# Patient Record
Sex: MALE
[De-identification: names, ages, dates, MRNs, and addresses within clinical notes are randomized per-mention and may not be internally consistent; named-entity substitution may affect disease eponyms.]

---

## 2017-04-18 ENCOUNTER — LABORATORY RESULT (OUTPATIENT)
Age: 67
End: 2017-04-18

## 2017-04-18 ENCOUNTER — APPOINTMENT (OUTPATIENT)
Dept: INTERNAL MEDICINE | Facility: CLINIC | Age: 67
End: 2017-04-18

## 2017-04-18 VITALS
WEIGHT: 229 LBS | TEMPERATURE: 98 F | SYSTOLIC BLOOD PRESSURE: 148 MMHG | RESPIRATION RATE: 16 BRPM | BODY MASS INDEX: 34.71 KG/M2 | OXYGEN SATURATION: 96 % | HEIGHT: 68 IN | HEART RATE: 104 BPM | DIASTOLIC BLOOD PRESSURE: 72 MMHG

## 2017-04-27 LAB
25(OH)D3 SERPL-MCNC: 14.9 NG/ML
ALBUMIN SERPL ELPH-MCNC: 4.9 G/DL
ALP BLD-CCNC: 89 U/L
ALT SERPL-CCNC: 33 U/L
ANION GAP SERPL CALC-SCNC: 18 MMOL/L
APPEARANCE: ABNORMAL
AST SERPL-CCNC: 26 U/L
BASOPHILS # BLD AUTO: 0.02 K/UL
BASOPHILS NFR BLD AUTO: 0.4 %
BILIRUB SERPL-MCNC: 0.3 MG/DL
BILIRUBIN URINE: NEGATIVE
BLOOD URINE: ABNORMAL
BUN SERPL-MCNC: 15 MG/DL
CALCIUM SERPL-MCNC: 10.3 MG/DL
CHLORIDE SERPL-SCNC: 102 MMOL/L
CHOLEST SERPL-MCNC: 202 MG/DL
CHOLEST/HDLC SERPL: 3.7 RATIO
CO2 SERPL-SCNC: 24 MMOL/L
COLOR: YELLOW
CREAT SERPL-MCNC: 0.96 MG/DL
CREAT SPEC-SCNC: 225 MG/DL
EOSINOPHIL # BLD AUTO: 0.19 K/UL
EOSINOPHIL NFR BLD AUTO: 3.5 %
FOLATE SERPL-MCNC: 11.6 NG/ML
GLUCOSE QUALITATIVE U: NORMAL MG/DL
GLUCOSE SERPL-MCNC: 113 MG/DL
HBA1C MFR BLD HPLC: 7.8 %
HCT VFR BLD CALC: 40.3 %
HDLC SERPL-MCNC: 55 MG/DL
HGB BLD-MCNC: 13.7 G/DL
IMM GRANULOCYTES NFR BLD AUTO: 0.2 %
KETONES URINE: NEGATIVE
LDLC SERPL CALC-MCNC: 121 MG/DL
LEUKOCYTE ESTERASE URINE: ABNORMAL
LYMPHOCYTES # BLD AUTO: 2.28 K/UL
LYMPHOCYTES NFR BLD AUTO: 41.5 %
MAN DIFF?: NORMAL
MCHC RBC-ENTMCNC: 27.1 PG
MCHC RBC-ENTMCNC: 34 GM/DL
MCV RBC AUTO: 79.8 FL
MICROALBUMIN 24H UR DL<=1MG/L-MCNC: 6.1 MG/DL
MICROALBUMIN/CREAT 24H UR-RTO: 27 UG/MG
MONOCYTES # BLD AUTO: 0.41 K/UL
MONOCYTES NFR BLD AUTO: 7.5 %
NEUTROPHILS # BLD AUTO: 2.59 K/UL
NEUTROPHILS NFR BLD AUTO: 46.9 %
NITRITE URINE: NEGATIVE
PH URINE: 5
PLATELET # BLD AUTO: 215 K/UL
POTASSIUM SERPL-SCNC: 4.9 MMOL/L
PROT SERPL-MCNC: 7.7 G/DL
PROTEIN URINE: ABNORMAL MG/DL
PSA SERPL-MCNC: 1.62 NG/ML
RBC # BLD: 5.05 M/UL
RBC # FLD: 14.5 %
SODIUM SERPL-SCNC: 144 MMOL/L
SPECIFIC GRAVITY URINE: 1.02
TRIGL SERPL-MCNC: 130 MG/DL
TSH SERPL-ACNC: 1.44 UIU/ML
UROBILINOGEN URINE: NORMAL MG/DL
VIT B12 SERPL-MCNC: 572 PG/ML
WBC # FLD AUTO: 5.5 K/UL

## 2017-05-16 ENCOUNTER — APPOINTMENT (OUTPATIENT)
Dept: CARDIOLOGY | Facility: CLINIC | Age: 67
End: 2017-05-16

## 2017-08-08 ENCOUNTER — APPOINTMENT (OUTPATIENT)
Dept: INTERNAL MEDICINE | Facility: CLINIC | Age: 67
End: 2017-08-08
Payer: MEDICARE

## 2017-08-08 VITALS
HEART RATE: 98 BPM | DIASTOLIC BLOOD PRESSURE: 76 MMHG | SYSTOLIC BLOOD PRESSURE: 150 MMHG | RESPIRATION RATE: 16 BRPM | HEIGHT: 68 IN | BODY MASS INDEX: 33.95 KG/M2 | OXYGEN SATURATION: 98 % | TEMPERATURE: 98.4 F | WEIGHT: 224 LBS

## 2017-08-08 PROCEDURE — 99214 OFFICE O/P EST MOD 30 MIN: CPT

## 2017-08-10 LAB
ALBUMIN SERPL ELPH-MCNC: 4.5 G/DL
ALP BLD-CCNC: 86 U/L
ALT SERPL-CCNC: 22 U/L
ANION GAP SERPL CALC-SCNC: 18 MMOL/L
AST SERPL-CCNC: 21 U/L
BASOPHILS # BLD AUTO: 0.03 K/UL
BASOPHILS NFR BLD AUTO: 0.6 %
BILIRUB SERPL-MCNC: 0.3 MG/DL
BUN SERPL-MCNC: 16 MG/DL
CALCIUM SERPL-MCNC: 9.9 MG/DL
CHLORIDE SERPL-SCNC: 103 MMOL/L
CO2 SERPL-SCNC: 22 MMOL/L
CREAT SERPL-MCNC: 0.84 MG/DL
CREAT SPEC-SCNC: 208 MG/DL
EOSINOPHIL # BLD AUTO: 0.15 K/UL
EOSINOPHIL NFR BLD AUTO: 2.9 %
GLUCOSE SERPL-MCNC: 148 MG/DL
HBA1C MFR BLD HPLC: 7.3 %
HCT VFR BLD CALC: 39.1 %
HGB BLD-MCNC: 13.5 G/DL
IMM GRANULOCYTES NFR BLD AUTO: 0.2 %
LYMPHOCYTES # BLD AUTO: 1.29 K/UL
LYMPHOCYTES NFR BLD AUTO: 25.1 %
MAN DIFF?: NORMAL
MCHC RBC-ENTMCNC: 27.5 PG
MCHC RBC-ENTMCNC: 34.5 GM/DL
MCV RBC AUTO: 79.6 FL
MICROALBUMIN 24H UR DL<=1MG/L-MCNC: 6.4 MG/DL
MICROALBUMIN/CREAT 24H UR-RTO: 31 MG/G
MONOCYTES # BLD AUTO: 0.39 K/UL
MONOCYTES NFR BLD AUTO: 7.6 %
NEUTROPHILS # BLD AUTO: 3.26 K/UL
NEUTROPHILS NFR BLD AUTO: 63.6 %
PLATELET # BLD AUTO: 188 K/UL
POTASSIUM SERPL-SCNC: 3.8 MMOL/L
PROT SERPL-MCNC: 7.6 G/DL
RBC # BLD: 4.91 M/UL
RBC # FLD: 14.2 %
SODIUM SERPL-SCNC: 143 MMOL/L
WBC # FLD AUTO: 5.13 K/UL

## 2017-10-03 ENCOUNTER — NON-APPOINTMENT (OUTPATIENT)
Age: 67
End: 2017-10-03

## 2017-10-03 ENCOUNTER — APPOINTMENT (OUTPATIENT)
Dept: CARDIOLOGY | Facility: CLINIC | Age: 67
End: 2017-10-03
Payer: MEDICARE

## 2017-10-03 VITALS
RESPIRATION RATE: 16 BRPM | HEIGHT: 68 IN | HEART RATE: 83 BPM | DIASTOLIC BLOOD PRESSURE: 75 MMHG | TEMPERATURE: 97.9 F | BODY MASS INDEX: 33.95 KG/M2 | WEIGHT: 224 LBS | OXYGEN SATURATION: 97 % | SYSTOLIC BLOOD PRESSURE: 165 MMHG

## 2017-10-03 VITALS — SYSTOLIC BLOOD PRESSURE: 154 MMHG | DIASTOLIC BLOOD PRESSURE: 71 MMHG

## 2017-10-03 DIAGNOSIS — Z00.00 ENCOUNTER FOR GENERAL ADULT MEDICAL EXAMINATION W/OUT ABNORMAL FINDINGS: ICD-10-CM

## 2017-10-03 PROCEDURE — 99204 OFFICE O/P NEW MOD 45 MIN: CPT

## 2017-10-03 PROCEDURE — 93000 ELECTROCARDIOGRAM COMPLETE: CPT

## 2017-10-05 RX ORDER — GABAPENTIN 100 MG/1
100 CAPSULE ORAL TWICE DAILY
Qty: 60 | Refills: 3 | Status: DISCONTINUED | COMMUNITY
Start: 2017-04-18 | End: 2017-10-05

## 2017-10-31 ENCOUNTER — APPOINTMENT (OUTPATIENT)
Dept: HOME HEALTH SERVICES | Facility: HOME HEALTH | Age: 67
End: 2017-10-31
Payer: MEDICARE

## 2017-10-31 VITALS
HEART RATE: 76 BPM | DIASTOLIC BLOOD PRESSURE: 70 MMHG | TEMPERATURE: 97.5 F | OXYGEN SATURATION: 97 % | RESPIRATION RATE: 18 BRPM | SYSTOLIC BLOOD PRESSURE: 142 MMHG

## 2017-10-31 PROCEDURE — 99344 HOME/RES VST NEW MOD MDM 60: CPT | Mod: 25

## 2017-10-31 PROCEDURE — G0506: CPT

## 2017-10-31 PROCEDURE — G0439: CPT

## 2017-10-31 PROCEDURE — 99497 ADVNCD CARE PLAN 30 MIN: CPT | Mod: 25,33

## 2017-10-31 PROCEDURE — G0008: CPT

## 2017-10-31 PROCEDURE — 90686 IIV4 VACC NO PRSV 0.5 ML IM: CPT

## 2017-11-03 ENCOUNTER — LABORATORY RESULT (OUTPATIENT)
Age: 67
End: 2017-11-03

## 2017-11-03 LAB
BASOPHILS # BLD AUTO: 0.02 K/UL
BASOPHILS NFR BLD AUTO: 0.4 %
EOSINOPHIL # BLD AUTO: 0.23 K/UL
EOSINOPHIL NFR BLD AUTO: 4.5 %
HBA1C MFR BLD HPLC: 7.5 %
HCT VFR BLD CALC: 38.4 %
HGB BLD-MCNC: 13.2 G/DL
IMM GRANULOCYTES NFR BLD AUTO: 0.2 %
LYMPHOCYTES # BLD AUTO: 2.2 K/UL
LYMPHOCYTES NFR BLD AUTO: 43.2 %
MAN DIFF?: NORMAL
MCHC RBC-ENTMCNC: 27.6 PG
MCHC RBC-ENTMCNC: 34.4 GM/DL
MCV RBC AUTO: 80.3 FL
MONOCYTES # BLD AUTO: 0.44 K/UL
MONOCYTES NFR BLD AUTO: 8.6 %
NEUTROPHILS # BLD AUTO: 2.19 K/UL
NEUTROPHILS NFR BLD AUTO: 43.1 %
PLATELET # BLD AUTO: 247 K/UL
RBC # BLD: 4.78 M/UL
RBC # FLD: 14.5 %
WBC # FLD AUTO: 5.09 K/UL

## 2017-11-07 LAB
25(OH)D3 SERPL-MCNC: 13.1 NG/ML
CHOLEST SERPL-MCNC: 187 MG/DL
CHOLEST/HDLC SERPL: 3.8 RATIO
FOLATE SERPL-MCNC: 12.7 NG/ML
HDLC SERPL-MCNC: 49 MG/DL
LDLC SERPL CALC-MCNC: 112 MG/DL
TRIGL SERPL-MCNC: 132 MG/DL
TSH SERPL-ACNC: 1.02 UIU/ML
URATE SERPL-MCNC: 5.9 MG/DL
VIT B12 SERPL-MCNC: 1775 PG/ML

## 2017-11-10 ENCOUNTER — MEDICATION RENEWAL (OUTPATIENT)
Age: 67
End: 2017-11-10

## 2017-11-13 ENCOUNTER — APPOINTMENT (OUTPATIENT)
Dept: CARDIOLOGY | Facility: CLINIC | Age: 67
End: 2017-11-13

## 2017-11-28 ENCOUNTER — APPOINTMENT (OUTPATIENT)
Dept: CARDIOLOGY | Facility: CLINIC | Age: 67
End: 2017-11-28

## 2017-12-05 ENCOUNTER — OTHER (OUTPATIENT)
Age: 67
End: 2017-12-05

## 2017-12-29 ENCOUNTER — OTHER (OUTPATIENT)
Age: 67
End: 2017-12-29

## 2018-02-20 ENCOUNTER — APPOINTMENT (OUTPATIENT)
Dept: HOME HEALTH SERVICES | Facility: HOME HEALTH | Age: 68
End: 2018-02-20
Payer: MEDICARE

## 2018-02-20 VITALS
DIASTOLIC BLOOD PRESSURE: 68 MMHG | HEART RATE: 79 BPM | RESPIRATION RATE: 18 BRPM | OXYGEN SATURATION: 97 % | SYSTOLIC BLOOD PRESSURE: 128 MMHG

## 2018-02-20 PROCEDURE — 99349 HOME/RES VST EST MOD MDM 40: CPT

## 2018-06-21 ENCOUNTER — APPOINTMENT (OUTPATIENT)
Dept: HOME HEALTH SERVICES | Facility: HOME HEALTH | Age: 68
End: 2018-06-21
Payer: MEDICARE

## 2018-06-21 VITALS
HEIGHT: 68 IN | WEIGHT: 220 LBS | DIASTOLIC BLOOD PRESSURE: 72 MMHG | RESPIRATION RATE: 16 BRPM | BODY MASS INDEX: 33.34 KG/M2 | SYSTOLIC BLOOD PRESSURE: 138 MMHG | OXYGEN SATURATION: 98 % | HEART RATE: 87 BPM | TEMPERATURE: 98.1 F

## 2018-06-21 PROCEDURE — 99349 HOME/RES VST EST MOD MDM 40: CPT | Mod: 25

## 2018-06-21 PROCEDURE — 99497 ADVNCD CARE PLAN 30 MIN: CPT

## 2018-06-25 ENCOUNTER — MEDICATION RENEWAL (OUTPATIENT)
Age: 68
End: 2018-06-25

## 2018-06-25 LAB
25(OH)D3 SERPL-MCNC: 18 NG/ML
ALBUMIN SERPL ELPH-MCNC: 4.4 G/DL
ALP BLD-CCNC: 84 U/L
ALT SERPL-CCNC: 28 U/L
ANION GAP SERPL CALC-SCNC: 18 MMOL/L
AST SERPL-CCNC: 24 U/L
BASOPHILS # BLD AUTO: 0.02 K/UL
BASOPHILS NFR BLD AUTO: 0.5 %
BILIRUB SERPL-MCNC: 0.3 MG/DL
BUN SERPL-MCNC: 21 MG/DL
CALCIUM SERPL-MCNC: 10.5 MG/DL
CHLORIDE SERPL-SCNC: 101 MMOL/L
CHOLEST SERPL-MCNC: 204 MG/DL
CHOLEST/HDLC SERPL: 3.7 RATIO
CO2 SERPL-SCNC: 23 MMOL/L
CREAT SERPL-MCNC: 1 MG/DL
EOSINOPHIL # BLD AUTO: 0.13 K/UL
EOSINOPHIL NFR BLD AUTO: 3.1 %
FOLATE SERPL-MCNC: 11.7 NG/ML
HBA1C MFR BLD HPLC: 7.9 %
HCT VFR BLD CALC: 41.2 %
HDLC SERPL-MCNC: 55 MG/DL
HGB BLD-MCNC: 13.9 G/DL
IMM GRANULOCYTES NFR BLD AUTO: 0 %
LDLC SERPL CALC-MCNC: 115 MG/DL
LYMPHOCYTES # BLD AUTO: 2.12 K/UL
LYMPHOCYTES NFR BLD AUTO: 50.8 %
MAN DIFF?: NORMAL
MCHC RBC-ENTMCNC: 27.1 PG
MCHC RBC-ENTMCNC: 33.7 GM/DL
MCV RBC AUTO: 80.3 FL
MONOCYTES # BLD AUTO: 0.3 K/UL
MONOCYTES NFR BLD AUTO: 7.2 %
NEUTROPHILS # BLD AUTO: 1.6 K/UL
NEUTROPHILS NFR BLD AUTO: 38.4 %
PLATELET # BLD AUTO: 221 K/UL
POTASSIUM SERPL-SCNC: 4.4 MMOL/L
PROT SERPL-MCNC: 7.8 G/DL
RBC # BLD: 5.13 M/UL
RBC # FLD: 15 %
SODIUM SERPL-SCNC: 142 MMOL/L
TRIGL SERPL-MCNC: 170 MG/DL
TSH SERPL-ACNC: 1.23 UIU/ML
URATE SERPL-MCNC: 5.4 MG/DL
VIT B12 SERPL-MCNC: 750 PG/ML
WBC # FLD AUTO: 4.17 K/UL

## 2018-08-20 ENCOUNTER — OTHER (OUTPATIENT)
Age: 68
End: 2018-08-20

## 2018-10-17 ENCOUNTER — APPOINTMENT (OUTPATIENT)
Dept: HOME HEALTH SERVICES | Facility: HOME HEALTH | Age: 68
End: 2018-10-17

## 2018-10-17 ENCOUNTER — APPOINTMENT (OUTPATIENT)
Dept: HOME HEALTH SERVICES | Facility: HOME HEALTH | Age: 68
End: 2018-10-17
Payer: MEDICARE

## 2018-10-17 VITALS
OXYGEN SATURATION: 98 % | SYSTOLIC BLOOD PRESSURE: 179 MMHG | DIASTOLIC BLOOD PRESSURE: 98 MMHG | RESPIRATION RATE: 14 BRPM | TEMPERATURE: 97.5 F | HEART RATE: 76 BPM

## 2018-10-17 DIAGNOSIS — F17.200 NICOTINE DEPENDENCE, UNSPECIFIED, UNCOMPLICATED: ICD-10-CM

## 2018-10-17 DIAGNOSIS — Z86.39 PERSONAL HISTORY OF OTHER ENDOCRINE, NUTRITIONAL AND METABOLIC DISEASE: ICD-10-CM

## 2018-10-17 PROCEDURE — 99349 HOME/RES VST EST MOD MDM 40: CPT | Mod: 25

## 2018-10-17 PROCEDURE — G0008: CPT

## 2018-10-17 PROCEDURE — 90662 IIV NO PRSV INCREASED AG IM: CPT

## 2018-10-17 PROCEDURE — 99406 BEHAV CHNG SMOKING 3-10 MIN: CPT

## 2018-10-18 PROBLEM — F17.200 CURRENT EVERY DAY SMOKER: Status: ACTIVE | Noted: 2018-10-18

## 2018-10-19 ENCOUNTER — LABORATORY RESULT (OUTPATIENT)
Age: 68
End: 2018-10-19

## 2018-10-23 ENCOUNTER — MEDICATION RENEWAL (OUTPATIENT)
Age: 68
End: 2018-10-23

## 2018-10-23 ENCOUNTER — TRANSCRIPTION ENCOUNTER (OUTPATIENT)
Age: 68
End: 2018-10-23

## 2018-10-23 ENCOUNTER — RX RENEWAL (OUTPATIENT)
Age: 68
End: 2018-10-23

## 2018-10-25 ENCOUNTER — APPOINTMENT (OUTPATIENT)
Dept: HOME HEALTH SERVICES | Facility: HOME HEALTH | Age: 68
End: 2018-10-25

## 2018-11-02 ENCOUNTER — APPOINTMENT (OUTPATIENT)
Dept: HOME HEALTH SERVICES | Facility: HOME HEALTH | Age: 68
End: 2018-11-02

## 2018-11-27 ENCOUNTER — TRANSCRIPTION ENCOUNTER (OUTPATIENT)
Age: 68
End: 2018-11-27

## 2018-11-28 ENCOUNTER — APPOINTMENT (OUTPATIENT)
Dept: HOME HEALTH SERVICES | Facility: HOME HEALTH | Age: 68
End: 2018-11-28

## 2018-11-28 VITALS
HEART RATE: 76 BPM | TEMPERATURE: 97.8 F | RESPIRATION RATE: 16 BRPM | SYSTOLIC BLOOD PRESSURE: 168 MMHG | DIASTOLIC BLOOD PRESSURE: 86 MMHG | OXYGEN SATURATION: 98 %

## 2018-12-03 PROBLEM — M65.331 TRIGGER MIDDLE FINGER OF RIGHT HAND: Status: RESOLVED | Noted: 2017-08-08 | Resolved: 2018-12-03

## 2018-12-03 PROBLEM — Z23 INFLUENZA VACCINE ADMINISTERED: Status: RESOLVED | Noted: 2017-10-31 | Resolved: 2018-12-03

## 2018-12-03 PROBLEM — Z87.898 HISTORY OF ITCHING: Status: RESOLVED | Noted: 2018-10-23 | Resolved: 2018-12-03

## 2018-12-03 PROBLEM — Z86.79 HISTORY OF ABNORMAL ELECTROCARDIOGRAPHY: Status: RESOLVED | Noted: 2017-10-03 | Resolved: 2018-12-03

## 2018-12-03 RX ORDER — LOSARTAN POTASSIUM 50 MG/1
50 TABLET, FILM COATED ORAL DAILY
Qty: 90 | Refills: 1 | Status: DISCONTINUED | COMMUNITY
Start: 2018-10-23 | End: 2018-12-03

## 2018-12-04 ENCOUNTER — APPOINTMENT (OUTPATIENT)
Dept: HOME HEALTH SERVICES | Facility: HOME HEALTH | Age: 68
End: 2018-12-04
Payer: MEDICARE

## 2018-12-04 VITALS
HEART RATE: 75 BPM | OXYGEN SATURATION: 98 % | RESPIRATION RATE: 14 BRPM | DIASTOLIC BLOOD PRESSURE: 73 MMHG | TEMPERATURE: 97.2 F | SYSTOLIC BLOOD PRESSURE: 159 MMHG

## 2018-12-04 DIAGNOSIS — Z23 ENCOUNTER FOR IMMUNIZATION: ICD-10-CM

## 2018-12-04 DIAGNOSIS — Z87.898 PERSONAL HISTORY OF OTHER SPECIFIED CONDITIONS: ICD-10-CM

## 2018-12-04 DIAGNOSIS — M65.331 TRIGGER FINGER, RIGHT MIDDLE FINGER: ICD-10-CM

## 2018-12-04 DIAGNOSIS — Z86.79 PERSONAL HISTORY OF OTHER DISEASES OF THE CIRCULATORY SYSTEM: ICD-10-CM

## 2018-12-04 PROCEDURE — 99406 BEHAV CHNG SMOKING 3-10 MIN: CPT

## 2018-12-04 PROCEDURE — 99350 HOME/RES VST EST HIGH MDM 60: CPT | Mod: 25

## 2018-12-04 RX ORDER — DOXEPIN HYDROCHLORIDE 50 MG/G
5 CREAM TOPICAL
Qty: 45 | Refills: 3 | Status: DISCONTINUED | COMMUNITY
Start: 2018-10-23 | End: 2018-12-04

## 2018-12-04 RX ORDER — OMEPRAZOLE 40 MG/1
40 CAPSULE, DELAYED RELEASE ORAL
Qty: 90 | Refills: 3 | Status: DISCONTINUED | COMMUNITY
Start: 2018-11-28 | End: 2018-12-04

## 2018-12-28 ENCOUNTER — APPOINTMENT (OUTPATIENT)
Dept: HOME HEALTH SERVICES | Facility: HOME HEALTH | Age: 68
End: 2018-12-28

## 2018-12-28 VITALS
DIASTOLIC BLOOD PRESSURE: 96 MMHG | SYSTOLIC BLOOD PRESSURE: 178 MMHG | HEART RATE: 78 BPM | TEMPERATURE: 98.2 F | RESPIRATION RATE: 16 BRPM | OXYGEN SATURATION: 98 %

## 2019-01-08 ENCOUNTER — OTHER (OUTPATIENT)
Age: 69
End: 2019-01-08

## 2019-01-11 ENCOUNTER — APPOINTMENT (OUTPATIENT)
Dept: HOME HEALTH SERVICES | Facility: HOME HEALTH | Age: 69
End: 2019-01-11

## 2019-03-08 ENCOUNTER — APPOINTMENT (OUTPATIENT)
Dept: HOME HEALTH SERVICES | Facility: HOME HEALTH | Age: 69
End: 2019-03-08
Payer: MEDICARE

## 2019-03-08 VITALS
RESPIRATION RATE: 16 BRPM | DIASTOLIC BLOOD PRESSURE: 74 MMHG | TEMPERATURE: 97.6 F | HEART RATE: 76 BPM | SYSTOLIC BLOOD PRESSURE: 162 MMHG | OXYGEN SATURATION: 97 %

## 2019-03-08 DIAGNOSIS — Z72.0 TOBACCO USE: ICD-10-CM

## 2019-03-08 DIAGNOSIS — Z71.89 OTHER SPECIFIED COUNSELING: ICD-10-CM

## 2019-03-08 DIAGNOSIS — R07.89 OTHER CHEST PAIN: ICD-10-CM

## 2019-03-08 DIAGNOSIS — M15.0 PRIMARY GENERALIZED (OSTEO)ARTHRITIS: ICD-10-CM

## 2019-03-08 PROCEDURE — 99349 HOME/RES VST EST MOD MDM 40: CPT | Mod: 25

## 2019-03-08 PROCEDURE — 99406 BEHAV CHNG SMOKING 3-10 MIN: CPT

## 2019-03-08 NOTE — CHRONIC CARE ASSESSMENT
[Oriented To Person] : ~L oriented to person [Oriented To Place] : ~L oriented to place [Oriented To Time] : ~L oriented to time

## 2019-03-12 PROBLEM — Z72.0 TOBACCO USER: Status: ACTIVE | Noted: 2018-12-04

## 2019-03-12 PROBLEM — M15.0 PRIMARY OSTEOARTHRITIS INVOLVING MULTIPLE JOINTS: Status: ACTIVE | Noted: 2017-10-31

## 2019-03-12 PROBLEM — Z71.89 ADVANCE CARE PLANNING: Status: RESOLVED | Noted: 2017-10-31 | Resolved: 2019-03-12

## 2019-03-12 PROBLEM — R07.89 ATYPICAL CHEST PAIN: Status: RESOLVED | Noted: 2018-12-04 | Resolved: 2019-03-12

## 2019-03-12 NOTE — PHYSICAL EXAM
[No Acute Distress] : no acute distress [Well Nourished] : well nourished [Well Developed] : well developed [Normal Sclera/Conjunctiva] : normal sclera/conjunctiva [EOMI] : extra ocular movement intact [Normal Outer Ear/Nose] : the ears and nose were normal in appearance [Normal Oropharynx] : the oropharynx was normal [No Respiratory Distress] : no respiratory distress [Clear to Auscultation] : lungs were clear to auscultation bilaterally [No Accessory Muscle Use] : no accessory muscle use [Normal Rate] : heart rate was normal  [Regular Rhythm] : with a regular rhythm [Normal S1, S2] : normal S1 and S2 [No Murmurs] : no murmurs heard [No Edema] : there was no peripheral edema [Normal Bowel Sounds] : normal bowel sounds [Non Tender] : non-tender [Soft] : abdomen soft [Not Distended] : not distended [Normal Post Cervical Nodes] : no posterior cervical lymphadenopathy [Normal Anterior Cervical Nodes] : no anterior cervical lymphadenopathy [No CVA Tenderness] : no ~M costovertebral angle tenderness [No Spinal Tenderness] : no spinal tenderness [Normal Gait] : normal gait [Normal Strength/Tone] : muscle strength and tone were normal [No Rash] : no rash [Oriented x3] : oriented to person, place, and time [Normal Affect] : the affect was normal [No Skin Lesions] : no skin lesions

## 2019-03-12 NOTE — COUNSELING
[Smoker, not interested in quitting] : Smoker, not interested in quitting [Remove clutter and unsafe carpeting to avoid falls] : remove clutter and unsafe carpeting to avoid falls [Improve mobility] : improve mobility [Improve weight] : improve weight [Discussed disease trajectory with patient/caregiver] : discussed disease trajectory with patient/caregiver [Completed Medical Orders for Life-Sustaining Treatment] : completed medical orders for life-sustaining treatment [Full Code] : Code Status: Full Code [No Limitations] : Treatment Guidelines: No limitations [Trial of Intubation] : Intubation: Trial of Intubation [[facility]: _____] [facility]last [facility] date: [unfilled] [_____] : HCP: [unfilled] [Normal Weight (BMI <25)] : normal weight - BMI <25 [Not Indicated] : not indicated [17997 - Tobacco Use Cessation Intermediate Greater Than 3 Minutes Up to 10 Minutes] : Tobacco Use Cessation Intermediate Greater Than 3 Minutes Up to 10 Minutes

## 2019-03-12 NOTE — REASON FOR VISIT
[Follow-Up] : a follow-up visit [Spouse] : spouse [Pre-Visit Preparation] : pre-visit preparation was done [Intercurrent Specialty/Sub-specialty Visits] : the patient has no intercurrent specialty/sub-specialty visits [FreeTextEntry1] : chronic conditions

## 2019-03-12 NOTE — HISTORY OF PRESENT ILLNESS
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

## 2019-03-20 ENCOUNTER — MEDICATION RENEWAL (OUTPATIENT)
Age: 69
End: 2019-03-20

## 2019-06-07 ENCOUNTER — APPOINTMENT (OUTPATIENT)
Dept: HOME HEALTH SERVICES | Facility: HOME HEALTH | Age: 69
End: 2019-06-07
Payer: MEDICARE

## 2019-06-07 VITALS
OXYGEN SATURATION: 98 % | HEART RATE: 78 BPM | DIASTOLIC BLOOD PRESSURE: 90 MMHG | SYSTOLIC BLOOD PRESSURE: 180 MMHG | RESPIRATION RATE: 14 BRPM | TEMPERATURE: 98 F

## 2019-06-07 PROCEDURE — 99349 HOME/RES VST EST MOD MDM 40: CPT

## 2019-06-10 NOTE — COUNSELING
[Normal Weight (BMI <25)] : normal weight - BMI <25 [Smoker, not interested in quitting] : Smoker, not interested in quitting [Remove clutter and unsafe carpeting to avoid falls] : remove clutter and unsafe carpeting to avoid falls [Not Indicated] : not indicated [Improve mobility] : improve mobility [Improve weight] : improve weight [Discussed disease trajectory with patient/caregiver] : discussed disease trajectory with patient/caregiver [Completed Medical Orders for Life-Sustaining Treatment] : completed medical orders for life-sustaining treatment [Full Code] : Code Status: Full Code [No Limitations] : Treatment Guidelines: No limitations [Trial of Intubation] : Intubation: Trial of Intubation [_____] : HCP: [unfilled] [Completed DNR] : completed DNR [Completed Healthcare Proxy] : completed healthcare proxy [[facility]: _____[facility]/last verification date: [unfilled]

## 2019-06-11 ENCOUNTER — RX RENEWAL (OUTPATIENT)
Age: 69
End: 2019-06-11

## 2019-06-11 ENCOUNTER — APPOINTMENT (OUTPATIENT)
Dept: HOME HEALTH SERVICES | Facility: HOME HEALTH | Age: 69
End: 2019-06-11

## 2019-06-11 NOTE — HISTORY OF PRESENT ILLNESS
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 [FreeTextEntry2] : PMH: diabetes mellitus type 2 with neuropathy,  hypertension, hyperlipidemia, smoker,  BPH, \par \par Pt A&Ox3 reports pain in both wrist with numbness of fingers which increases at night; also reports has been "taking medications sparingly because of my insurance"\par \par DM: A1c 8.9 in October, not checking blood sugars, not taking  metformin glipizide or trulicity; has hand pain today- on gabapentin but not taking medications as above\par \par HTN: on amlodipine, losartan, chlorthalidone- but not taking\par \par HLD: elevated triglycerides, cholesterol in 6/18- on atorvastatin - but not taking\par \par BPH: on doxazosin- has had no urinary issues- but not taking\par \par Appetite good, has no constipation,

## 2019-06-21 LAB
ALBUMIN SERPL ELPH-MCNC: 4.3 G/DL
ALP BLD-CCNC: 93 U/L
ALT SERPL-CCNC: 12 U/L
ANION GAP SERPL CALC-SCNC: 12 MMOL/L
AST SERPL-CCNC: 11 U/L
BASOPHILS # BLD AUTO: 0.03 K/UL
BASOPHILS NFR BLD AUTO: 0.6 %
BILIRUB SERPL-MCNC: 0.5 MG/DL
BUN SERPL-MCNC: 13 MG/DL
CALCIUM SERPL-MCNC: 10 MG/DL
CHLORIDE SERPL-SCNC: 100 MMOL/L
CHOLEST SERPL-MCNC: 270 MG/DL
CHOLEST/HDLC SERPL: 5.9 RATIO
CO2 SERPL-SCNC: 28 MMOL/L
CREAT SERPL-MCNC: 1.01 MG/DL
EOSINOPHIL # BLD AUTO: 0.16 K/UL
EOSINOPHIL NFR BLD AUTO: 3.1 %
ESTIMATED AVERAGE GLUCOSE: 229 MG/DL
HBA1C MFR BLD HPLC: 9.6 %
HCT VFR BLD CALC: 41 %
HDLC SERPL-MCNC: 46 MG/DL
HGB BLD-MCNC: 14 G/DL
IMM GRANULOCYTES NFR BLD AUTO: 0.2 %
LDLC SERPL CALC-MCNC: 189 MG/DL
LYMPHOCYTES # BLD AUTO: 2.1 K/UL
LYMPHOCYTES NFR BLD AUTO: 41.3 %
MAN DIFF?: NORMAL
MCHC RBC-ENTMCNC: 26.8 PG
MCHC RBC-ENTMCNC: 34.1 GM/DL
MCV RBC AUTO: 78.4 FL
MONOCYTES # BLD AUTO: 0.39 K/UL
MONOCYTES NFR BLD AUTO: 7.7 %
NEUTROPHILS # BLD AUTO: 2.39 K/UL
NEUTROPHILS NFR BLD AUTO: 47.1 %
PLATELET # BLD AUTO: 226 K/UL
POTASSIUM SERPL-SCNC: 4.4 MMOL/L
PROT SERPL-MCNC: 7 G/DL
RBC # BLD: 5.23 M/UL
RBC # FLD: 14.1 %
SODIUM SERPL-SCNC: 140 MMOL/L
TRIGL SERPL-MCNC: 177 MG/DL
WBC # FLD AUTO: 5.08 K/UL

## 2019-07-10 ENCOUNTER — MEDICATION RENEWAL (OUTPATIENT)
Age: 69
End: 2019-07-10

## 2019-07-24 ENCOUNTER — APPOINTMENT (OUTPATIENT)
Dept: HOME HEALTH SERVICES | Facility: HOME HEALTH | Age: 69
End: 2019-07-24

## 2019-07-24 VITALS
OXYGEN SATURATION: 98 % | HEART RATE: 76 BPM | DIASTOLIC BLOOD PRESSURE: 86 MMHG | SYSTOLIC BLOOD PRESSURE: 162 MMHG | TEMPERATURE: 98.6 F | RESPIRATION RATE: 16 BRPM

## 2019-08-08 ENCOUNTER — APPOINTMENT (OUTPATIENT)
Dept: HOME HEALTH SERVICES | Facility: HOME HEALTH | Age: 69
End: 2019-08-08

## 2019-08-12 ENCOUNTER — APPOINTMENT (OUTPATIENT)
Dept: HOME HEALTH SERVICES | Facility: HOME HEALTH | Age: 69
End: 2019-08-12

## 2019-08-19 ENCOUNTER — APPOINTMENT (OUTPATIENT)
Dept: HOME HEALTH SERVICES | Facility: HOME HEALTH | Age: 69
End: 2019-08-19
Payer: MEDICARE

## 2019-08-19 VITALS
RESPIRATION RATE: 14 BRPM | HEART RATE: 76 BPM | SYSTOLIC BLOOD PRESSURE: 150 MMHG | TEMPERATURE: 97.9 F | DIASTOLIC BLOOD PRESSURE: 84 MMHG | OXYGEN SATURATION: 98 %

## 2019-08-19 PROCEDURE — 99349 HOME/RES VST EST MOD MDM 40: CPT

## 2019-08-19 NOTE — COUNSELING
[Improve mobility] : improve mobility [Improve weight] : improve weight [Discussed disease trajectory with patient/caregiver] : discussed disease trajectory with patient/caregiver [Completed Healthcare Proxy] : completed healthcare proxy [Completed DNR] : completed DNR [Completed Medical Orders for Life-Sustaining Treatment] : completed medical orders for life-sustaining treatment [Full Code] : Code Status: Full Code [Trial of Intubation] : Intubation: Trial of Intubation [No Limitations] : Treatment Guidelines: No limitations [[facility]: _____] [facility]last verification date: [unfilled] [_____] : HCP: [unfilled] [Normal Weight (BMI <25)] : normal weight - BMI <25 [Remove clutter and unsafe carpeting to avoid falls] : remove clutter and unsafe carpeting to avoid falls [Smoker, not interested in quitting] : Smoker, not interested in quitting [Not Indicated] : not indicated

## 2019-08-20 ENCOUNTER — APPOINTMENT (OUTPATIENT)
Dept: HOME HEALTH SERVICES | Facility: HOME HEALTH | Age: 69
End: 2019-08-20

## 2019-08-21 NOTE — PHYSICAL EXAM
[No Acute Distress] : no acute distress [Well Nourished] : well nourished [Well Developed] : well developed [EOMI] : extra ocular movement intact [Normal Sclera/Conjunctiva] : normal sclera/conjunctiva [Normal Outer Ear/Nose] : the ears and nose were normal in appearance [Normal Oropharynx] : the oropharynx was normal [No Respiratory Distress] : no respiratory distress [Clear to Auscultation] : lungs were clear to auscultation bilaterally [No Accessory Muscle Use] : no accessory muscle use [Regular Rhythm] : with a regular rhythm [Normal Rate] : heart rate was normal  [Normal S1, S2] : normal S1 and S2 [No Murmurs] : no murmurs heard [No Edema] : there was no peripheral edema [Normal Bowel Sounds] : normal bowel sounds [Non Tender] : non-tender [Soft] : abdomen soft [Not Distended] : not distended [Normal Post Cervical Nodes] : no posterior cervical lymphadenopathy [Normal Anterior Cervical Nodes] : no anterior cervical lymphadenopathy [No CVA Tenderness] : no ~M costovertebral angle tenderness [No Spinal Tenderness] : no spinal tenderness [Normal Gait] : normal gait [Normal Strength/Tone] : muscle strength and tone were normal [No Rash] : no rash [No Skin Lesions] : no skin lesions [Oriented x3] : oriented to person, place, and time [Normal Affect] : the affect was normal [de-identified] : (+) phalen's sign

## 2019-08-21 NOTE — HISTORY OF PRESENT ILLNESS
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 [FreeTextEntry2] : PMH: diabetes mellitus type 2 with neuropathy,  hypertension, hyperlipidemia, smoker,  BPH, \par \par Pt A&Ox3 reports pain in both wrist with numbness of fingers which increases at night- wrist braces ordered but never received; also reports has been taking his medications again \par \par DM: A1c9.6 n June,  not checking blood sugars- was off medications secondary to insurance issues\par \par HTN: on amlodipine, losartan, chlorthalidone- \par \par HLD: elevated triglycerides, cholesterol on 6/18- was off medications secondary to insurance issues\par \par BPH: on doxazosin- has had no urinary issues-\par \par Appetite good, has no constipation,

## 2019-08-21 NOTE — REASON FOR VISIT
[Follow-Up] : a follow-up visit [Pre-Visit Preparation] : pre-visit preparation was done [Spouse] : spouse [Intercurrent Specialty/Sub-specialty Visits] : the patient has no intercurrent specialty/sub-specialty visits [FreeTextEntry1] : chronic conditions

## 2019-09-17 ENCOUNTER — APPOINTMENT (OUTPATIENT)
Dept: HOME HEALTH SERVICES | Facility: HOME HEALTH | Age: 69
End: 2019-09-17

## 2019-10-11 ENCOUNTER — APPOINTMENT (OUTPATIENT)
Dept: HOME HEALTH SERVICES | Facility: HOME HEALTH | Age: 69
End: 2019-10-11
Payer: MEDICARE

## 2019-10-11 VITALS
DIASTOLIC BLOOD PRESSURE: 90 MMHG | SYSTOLIC BLOOD PRESSURE: 170 MMHG | TEMPERATURE: 98.1 F | OXYGEN SATURATION: 98 % | RESPIRATION RATE: 14 BRPM | HEART RATE: 75 BPM

## 2019-10-11 PROCEDURE — G0008: CPT

## 2019-10-11 PROCEDURE — 90686 IIV4 VACC NO PRSV 0.5 ML IM: CPT

## 2019-10-11 PROCEDURE — 99349 HOME/RES VST EST MOD MDM 40: CPT | Mod: 25

## 2019-10-15 ENCOUNTER — MED ADMIN CHARGE (OUTPATIENT)
Age: 69
End: 2019-10-15

## 2019-10-15 NOTE — COUNSELING
[Improve mobility] : improve mobility [Improve weight] : improve weight [Discussed disease trajectory with patient/caregiver] : discussed disease trajectory with patient/caregiver [Completed Healthcare Proxy] : completed healthcare proxy [Completed DNR] : completed DNR [Completed Medical Orders for Life-Sustaining Treatment] : completed medical orders for life-sustaining treatment [Full Code] : Code Status: Full Code [No Limitations] : Treatment Guidelines: No limitations [Trial of Intubation] : Intubation: Trial of Intubation [[facility]: _____] [facility]last verification date: [unfilled] [_____] : HCP: [unfilled] [Normal Weight (BMI <25)] : normal weight - BMI <25 [Remove clutter and unsafe carpeting to avoid falls] : remove clutter and unsafe carpeting to avoid falls [Not Indicated] : not indicated [Normal Weight - ( BMI  <25 )] : normal weight - ( BMI  <25 ) [DASH diet recommended] : DASH diet recommended [Smoker, not interested in quitting] : smoker, not interested in quitting [] : abdominal aortic ultrasound [Date: ___] : diabetic screening completed on [unfilled]

## 2019-10-15 NOTE — PHYSICAL EXAM
[No Acute Distress] : no acute distress [Well Nourished] : well nourished [Well Developed] : well developed [Normal Sclera/Conjunctiva] : normal sclera/conjunctiva [EOMI] : extra ocular movement intact [Normal Outer Ear/Nose] : the ears and nose were normal in appearance [Normal Oropharynx] : the oropharynx was normal [No Respiratory Distress] : no respiratory distress [Clear to Auscultation] : lungs were clear to auscultation bilaterally [No Accessory Muscle Use] : no accessory muscle use [Normal Rate] : heart rate was normal  [Regular Rhythm] : with a regular rhythm [Normal S1, S2] : normal S1 and S2 [No Murmurs] : no murmurs heard [No Edema] : there was no peripheral edema [Normal Bowel Sounds] : normal bowel sounds [Non Tender] : non-tender [Soft] : abdomen soft [Not Distended] : not distended [Normal Post Cervical Nodes] : no posterior cervical lymphadenopathy [Normal Anterior Cervical Nodes] : no anterior cervical lymphadenopathy [No CVA Tenderness] : no ~M costovertebral angle tenderness [No Spinal Tenderness] : no spinal tenderness [Normal Gait] : normal gait [Normal Strength/Tone] : muscle strength and tone were normal [No Rash] : no rash [No Skin Lesions] : no skin lesions [Oriented x3] : oriented to person, place, and time [Normal Affect] : the affect was normal [de-identified] : (+) phalen's sign

## 2019-10-15 NOTE — HISTORY OF PRESENT ILLNESS
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 [FreeTextEntry2] : PMH: diabetes mellitus type 2 with neuropathy,  hypertension, hyperlipidemia, smoker,  BPH, \par \par Pt A&Ox3 with no c/o\par \par DM: A1c9.6 n June,  not checking blood sugars- still not taking  medications secondary to adherence/ insurance issues\par \par HTN: on amlodipine, losartan, chlorthalidone- \par \par HLD: elevated triglycerides, cholesterol on 6/18- still not taking  medications secondary to adherence/ insurance issues\par \par BPH: on doxazosin- has had no urinary issues-\par \par Appetite good, has no constipation,

## 2019-10-18 ENCOUNTER — APPOINTMENT (OUTPATIENT)
Dept: HOME HEALTH SERVICES | Facility: HOME HEALTH | Age: 69
End: 2019-10-18

## 2019-10-23 LAB
25(OH)D3 SERPL-MCNC: 27.8 NG/ML
ALBUMIN SERPL ELPH-MCNC: 4.4 G/DL
ALP BLD-CCNC: 127 U/L
ALT SERPL-CCNC: 16 U/L
ANION GAP SERPL CALC-SCNC: 14 MMOL/L
AST SERPL-CCNC: 16 U/L
BASOPHILS # BLD AUTO: 0.04 K/UL
BASOPHILS NFR BLD AUTO: 0.7 %
BILIRUB SERPL-MCNC: 0.2 MG/DL
BUN SERPL-MCNC: 18 MG/DL
CALCIUM SERPL-MCNC: 9.5 MG/DL
CHLORIDE SERPL-SCNC: 100 MMOL/L
CHOLEST SERPL-MCNC: 234 MG/DL
CHOLEST/HDLC SERPL: 5 RATIO
CO2 SERPL-SCNC: 25 MMOL/L
CREAT SERPL-MCNC: 1.45 MG/DL
EOSINOPHIL # BLD AUTO: 0.21 K/UL
EOSINOPHIL NFR BLD AUTO: 3.8 %
ESTIMATED AVERAGE GLUCOSE: 235 MG/DL
HBA1C MFR BLD HPLC: 9.8 %
HCT VFR BLD CALC: 40.3 %
HDLC SERPL-MCNC: 47 MG/DL
HGB BLD-MCNC: 13.7 G/DL
IMM GRANULOCYTES NFR BLD AUTO: 0.4 %
LDLC SERPL CALC-MCNC: 135 MG/DL
LYMPHOCYTES # BLD AUTO: 2.55 K/UL
LYMPHOCYTES NFR BLD AUTO: 46.5 %
MAN DIFF?: NORMAL
MCHC RBC-ENTMCNC: 27.2 PG
MCHC RBC-ENTMCNC: 34 GM/DL
MCV RBC AUTO: 80 FL
MONOCYTES # BLD AUTO: 0.44 K/UL
MONOCYTES NFR BLD AUTO: 8 %
NEUTROPHILS # BLD AUTO: 2.22 K/UL
NEUTROPHILS NFR BLD AUTO: 40.6 %
PLATELET # BLD AUTO: 207 K/UL
POTASSIUM SERPL-SCNC: 4.7 MMOL/L
PROT SERPL-MCNC: 7.1 G/DL
RBC # BLD: 5.04 M/UL
RBC # FLD: 14.6 %
SODIUM SERPL-SCNC: 139 MMOL/L
TRIGL SERPL-MCNC: 259 MG/DL
TSH SERPL-ACNC: 0.77 UIU/ML
WBC # FLD AUTO: 5.48 K/UL

## 2019-11-11 ENCOUNTER — APPOINTMENT (OUTPATIENT)
Dept: HOME HEALTH SERVICES | Facility: HOME HEALTH | Age: 69
End: 2019-11-11

## 2019-12-27 ENCOUNTER — OTHER (OUTPATIENT)
Age: 69
End: 2019-12-27

## 2020-01-07 ENCOUNTER — APPOINTMENT (OUTPATIENT)
Dept: HOME HEALTH SERVICES | Facility: HOME HEALTH | Age: 70
End: 2020-01-07

## 2020-01-09 ENCOUNTER — APPOINTMENT (OUTPATIENT)
Dept: HOME HEALTH SERVICES | Facility: HOME HEALTH | Age: 70
End: 2020-01-09
Payer: MEDICARE

## 2020-01-09 VITALS
RESPIRATION RATE: 16 BRPM | SYSTOLIC BLOOD PRESSURE: 140 MMHG | DIASTOLIC BLOOD PRESSURE: 60 MMHG | OXYGEN SATURATION: 96 % | HEART RATE: 70 BPM | TEMPERATURE: 98 F

## 2020-01-09 PROCEDURE — 99349 HOME/RES VST EST MOD MDM 40: CPT

## 2020-01-13 NOTE — PHYSICAL EXAM
[No Acute Distress] : no acute distress [Well Nourished] : well nourished [Well Developed] : well developed [Normal Sclera/Conjunctiva] : normal sclera/conjunctiva [EOMI] : extra ocular movement intact [Normal Outer Ear/Nose] : the ears and nose were normal in appearance [Normal Oropharynx] : the oropharynx was normal [No Respiratory Distress] : no respiratory distress [Clear to Auscultation] : lungs were clear to auscultation bilaterally [No Accessory Muscle Use] : no accessory muscle use [Normal Rate] : heart rate was normal  [Regular Rhythm] : with a regular rhythm [Normal S1, S2] : normal S1 and S2 [No Murmurs] : no murmurs heard [No Edema] : there was no peripheral edema [Normal Bowel Sounds] : normal bowel sounds [Non Tender] : non-tender [Soft] : abdomen soft [Not Distended] : not distended [Normal Post Cervical Nodes] : no posterior cervical lymphadenopathy [Normal Anterior Cervical Nodes] : no anterior cervical lymphadenopathy [No CVA Tenderness] : no ~M costovertebral angle tenderness [No Spinal Tenderness] : no spinal tenderness [Normal Gait] : normal gait [No Joint Swelling] : no joint swelling seen [No Clubbing, Cyanosis] : no clubbing  or cyanosis of the fingernails [Normal Strength/Tone] : muscle strength and tone were normal [No Rash] : no rash [No Skin Lesions] : no skin lesions [Oriented x3] : oriented to person, place, and time [Normal Affect] : the affect was normal

## 2020-01-13 NOTE — COUNSELING
[Normal Weight - ( BMI  <25 )] : normal weight - ( BMI  <25 ) [DASH diet recommended] : DASH diet recommended [Smoker, not interested in quitting] : smoker, not interested in quitting [] : abdominal aortic ultrasound [Date: ___] : diabetic screening completed on [unfilled] [Improve mobility] : improve mobility [Improve weight] : improve weight [Discussed disease trajectory with patient/caregiver] : discussed disease trajectory with patient/caregiver [Completed Healthcare Proxy] : completed healthcare proxy [Completed DNR] : completed DNR [Completed Medical Orders for Life-Sustaining Treatment] : completed medical orders for life-sustaining treatment [Full Code] : Code Status: Full Code [No Limitations] : Treatment Guidelines: No limitations [Trial of Intubation] : Intubation: Trial of Intubation [Last [facility] Date: _____] [facility]/last verification date: [unfilled] [_____] : HCP: [unfilled]

## 2020-01-17 ENCOUNTER — APPOINTMENT (OUTPATIENT)
Dept: HOME HEALTH SERVICES | Facility: HOME HEALTH | Age: 70
End: 2020-01-17

## 2020-03-06 NOTE — ASSESSMENT
[FreeTextEntry1] : Connor Baez  has a mobility limitation of osteoarthritis that significantly impairs his/her ability to participate in mobility related activities of daily living in the home including toileting, grooming, and bathing entirely. Connor Baez  is able to safely use the mobility device and his/her functional mobility deficit can be sufficiently resolved with the use of the this device. Will order a cane\par

## 2020-03-06 NOTE — HISTORY OF PRESENT ILLNESS
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 [FreeTextEntry2] : PMH: diabetes mellitus type 2 with neuropathy,  hypertension, hyperlipidemia, smoker,  BPH, \par \par Interval events: none\par \par Pt A&Ox3 reports he feels well \par \par DM: A1c 9.8 in October,  not checking blood sugars- still not taking  medications secondary to adherence/ insurance issues\par \par CKD: Creat 1.45 GFR 57 on October\par \par HTN: on amlodipine, losartan, chlorthalidone- \par \par HLD: elevated triglycerides, cholesterol on 10/21- still not taking  medications secondary to adherence/ insurance issues\par \par BPH: on doxazosin- has had no urinary issues-\par \par Appetite good, has no constipation,

## 2020-03-27 ENCOUNTER — APPOINTMENT (OUTPATIENT)
Dept: HOME HEALTH SERVICES | Facility: HOME HEALTH | Age: 70
End: 2020-03-27

## 2020-03-30 ENCOUNTER — APPOINTMENT (OUTPATIENT)
Dept: HOME HEALTH SERVICES | Facility: HOME HEALTH | Age: 70
End: 2020-03-30

## 2020-04-02 ENCOUNTER — TRANSCRIPTION ENCOUNTER (OUTPATIENT)
Age: 70
End: 2020-04-02

## 2020-05-28 ENCOUNTER — APPOINTMENT (OUTPATIENT)
Dept: HOME HEALTH SERVICES | Facility: HOME HEALTH | Age: 70
End: 2020-05-28

## 2020-10-12 ENCOUNTER — APPOINTMENT (OUTPATIENT)
Dept: HOME HEALTH SERVICES | Facility: HOME HEALTH | Age: 70
End: 2020-10-12

## 2020-10-14 ENCOUNTER — MED ADMIN CHARGE (OUTPATIENT)
Age: 70
End: 2020-10-14

## 2020-10-14 ENCOUNTER — APPOINTMENT (OUTPATIENT)
Dept: HOME HEALTH SERVICES | Facility: HOME HEALTH | Age: 70
End: 2020-10-14
Payer: MEDICARE

## 2020-10-14 VITALS
OXYGEN SATURATION: 96 % | SYSTOLIC BLOOD PRESSURE: 150 MMHG | DIASTOLIC BLOOD PRESSURE: 90 MMHG | HEART RATE: 97 BPM | TEMPERATURE: 97.8 F | RESPIRATION RATE: 17 BRPM

## 2020-10-14 PROCEDURE — G0008: CPT

## 2020-10-14 PROCEDURE — 90662 IIV NO PRSV INCREASED AG IM: CPT

## 2020-10-14 RX ORDER — AZITHROMYCIN 500 MG/1
500 TABLET, FILM COATED ORAL DAILY
Qty: 7 | Refills: 0 | Status: DISCONTINUED | COMMUNITY
Start: 2019-07-24 | End: 2020-10-14

## 2020-11-03 ENCOUNTER — NON-APPOINTMENT (OUTPATIENT)
Age: 70
End: 2020-11-03

## 2020-11-10 ENCOUNTER — NON-APPOINTMENT (OUTPATIENT)
Age: 70
End: 2020-11-10

## 2020-12-15 ENCOUNTER — APPOINTMENT (OUTPATIENT)
Dept: HOME HEALTH SERVICES | Facility: HOME HEALTH | Age: 70
End: 2020-12-15

## 2020-12-15 ENCOUNTER — NON-APPOINTMENT (OUTPATIENT)
Age: 70
End: 2020-12-15

## 2021-01-19 ENCOUNTER — NON-APPOINTMENT (OUTPATIENT)
Age: 71
End: 2021-01-19

## 2021-01-19 ENCOUNTER — APPOINTMENT (OUTPATIENT)
Dept: HOME HEALTH SERVICES | Facility: HOME HEALTH | Age: 71
End: 2021-01-19

## 2021-01-19 VITALS
DIASTOLIC BLOOD PRESSURE: 80 MMHG | SYSTOLIC BLOOD PRESSURE: 160 MMHG | HEART RATE: 73 BPM | RESPIRATION RATE: 18 BRPM | OXYGEN SATURATION: 98 %

## 2021-01-28 ENCOUNTER — NON-APPOINTMENT (OUTPATIENT)
Age: 71
End: 2021-01-28

## 2021-01-29 ENCOUNTER — APPOINTMENT (OUTPATIENT)
Dept: HOME HEALTH SERVICES | Facility: HOME HEALTH | Age: 71
End: 2021-01-29

## 2021-01-29 ENCOUNTER — APPOINTMENT (OUTPATIENT)
Dept: HOME HEALTH SERVICES | Facility: HOME HEALTH | Age: 71
End: 2021-01-29
Payer: MEDICARE

## 2021-01-29 VITALS
SYSTOLIC BLOOD PRESSURE: 180 MMHG | DIASTOLIC BLOOD PRESSURE: 110 MMHG | HEART RATE: 70 BPM | TEMPERATURE: 97.6 F | OXYGEN SATURATION: 98 % | RESPIRATION RATE: 16 BRPM

## 2021-01-29 PROCEDURE — 99349 HOME/RES VST EST MOD MDM 40: CPT

## 2021-01-29 NOTE — PHYSICAL EXAM
[No Acute Distress] : no acute distress [Well Nourished] : well nourished [Well Developed] : well developed [Normal Sclera/Conjunctiva] : normal sclera/conjunctiva [EOMI] : extra ocular movement intact [Normal Outer Ear/Nose] : the ears and nose were normal in appearance [Normal Oropharynx] : the oropharynx was normal [No Respiratory Distress] : no respiratory distress [Clear to Auscultation] : lungs were clear to auscultation bilaterally [No Accessory Muscle Use] : no accessory muscle use [Normal Rate] : heart rate was normal  [Regular Rhythm] : with a regular rhythm [Normal S1, S2] : normal S1 and S2 [No Murmurs] : no murmurs heard [No Edema] : there was no peripheral edema [Normal Bowel Sounds] : normal bowel sounds [Non Tender] : non-tender [Soft] : abdomen soft [Not Distended] : not distended [No CVA Tenderness] : no ~M costovertebral angle tenderness [No Spinal Tenderness] : no spinal tenderness [Normal Gait] : normal gait [No Joint Swelling] : no joint swelling seen [No Clubbing, Cyanosis] : no clubbing  or cyanosis of the fingernails [Normal Strength/Tone] : muscle strength and tone were normal [No Rash] : no rash [No Skin Lesions] : no skin lesions [Oriented x3] : oriented to person, place, and time [Normal Affect] : the affect was normal

## 2021-02-01 ENCOUNTER — NON-APPOINTMENT (OUTPATIENT)
Age: 71
End: 2021-02-01

## 2021-02-03 NOTE — COUNSELING
[Normal Weight - ( BMI  <25 )] : normal weight - ( BMI  <25 ) [DASH diet recommended] : DASH diet recommended [Smoker, not interested in quitting] : smoker, not interested in quitting [] : abdominal aortic ultrasound [Date: ___] : diabetic screening completed on [unfilled] [Improve mobility] : improve mobility [Improve weight] : improve weight [Discussed disease trajectory with patient/caregiver] : discussed disease trajectory with patient/caregiver [Completed Healthcare Proxy] : completed healthcare proxy [Completed DNR] : completed DNR [Completed Medical Orders for Life-Sustaining Treatment] : completed medical orders for life-sustaining treatment [Full Code] : Code Status: Full Code [No Limitations] : Treatment Guidelines: No limitations [Trial of Intubation] : Intubation: Trial of Intubation [_____] : HCP: [unfilled] [Last Ver[facility] Date: _____] [facility]/last verification date: [unfilled]

## 2021-02-03 NOTE — HISTORY OF PRESENT ILLNESS
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 Patient has not been in close contact with someone covid positive. \par N95 mask, gloves, eye wear used during visit: [Y]. Total face to face time with patient is [35] min.\par \par PMH: diabetes mellitus type 2 with neuropathy,  hypertension, hyperlipidemia, smoker,  BPH, \par \par Interval events: none but has not been taking medications "for a few months"\par \par Pt A&Ox3 reports low back pain :really bad"; requesting PT\par \par DM: A1c 9.8 in 10/19,  not checking blood sugars- not taking medications \par \par CKD: Creat 1.45 GFR 57 on 10/19\par \par HTN:  amlodipine, losartan, chlorthalidone ordered but not taking medications\par \par HLD: elevated triglycerides, cholesterol in 10/19 \par \par BPH: on doxazosin- but not taking denies urinary issues-\par \par Appetite good, has no constipation,

## 2021-02-08 LAB
25(OH)D3 SERPL-MCNC: 42.4 NG/ML
ALBUMIN SERPL ELPH-MCNC: 4.5 G/DL
ALP BLD-CCNC: 109 U/L
ALT SERPL-CCNC: 12 U/L
ANION GAP SERPL CALC-SCNC: 16 MMOL/L
AST SERPL-CCNC: 15 U/L
BASOPHILS # BLD AUTO: 0.03 K/UL
BASOPHILS NFR BLD AUTO: 0.6 %
BILIRUB SERPL-MCNC: 0.3 MG/DL
BUN SERPL-MCNC: 8 MG/DL
CALCIUM SERPL-MCNC: 10.3 MG/DL
CHLORIDE SERPL-SCNC: 100 MMOL/L
CHOLEST SERPL-MCNC: 195 MG/DL
CO2 SERPL-SCNC: 24 MMOL/L
CREAT SERPL-MCNC: 0.89 MG/DL
EOSINOPHIL # BLD AUTO: 0.18 K/UL
EOSINOPHIL NFR BLD AUTO: 3.5 %
ESTIMATED AVERAGE GLUCOSE: 197 MG/DL
HBA1C MFR BLD HPLC: 8.5 %
HCT VFR BLD CALC: 41.2 %
HDLC SERPL-MCNC: 53 MG/DL
HGB BLD-MCNC: 14 G/DL
IMM GRANULOCYTES NFR BLD AUTO: 0.2 %
LDLC SERPL CALC-MCNC: 126 MG/DL
LYMPHOCYTES # BLD AUTO: 2.23 K/UL
LYMPHOCYTES NFR BLD AUTO: 43.6 %
MAN DIFF?: NORMAL
MCHC RBC-ENTMCNC: 26.9 PG
MCHC RBC-ENTMCNC: 34 GM/DL
MCV RBC AUTO: 79.2 FL
MONOCYTES # BLD AUTO: 0.34 K/UL
MONOCYTES NFR BLD AUTO: 6.7 %
NEUTROPHILS # BLD AUTO: 2.32 K/UL
NEUTROPHILS NFR BLD AUTO: 45.4 %
NONHDLC SERPL-MCNC: 142 MG/DL
PLATELET # BLD AUTO: 277 K/UL
POTASSIUM SERPL-SCNC: 4.1 MMOL/L
PROT SERPL-MCNC: 7.5 G/DL
RBC # BLD: 5.2 M/UL
RBC # FLD: 13.6 %
SODIUM SERPL-SCNC: 139 MMOL/L
TRIGL SERPL-MCNC: 84 MG/DL
TSH SERPL-ACNC: 0.7 UIU/ML
WBC # FLD AUTO: 5.11 K/UL

## 2021-02-09 LAB — PSA SERPL-MCNC: 2.26 NG/ML

## 2021-02-10 ENCOUNTER — LABORATORY RESULT (OUTPATIENT)
Age: 71
End: 2021-02-10

## 2021-02-16 ENCOUNTER — RX RENEWAL (OUTPATIENT)
Age: 71
End: 2021-02-16

## 2021-03-01 ENCOUNTER — NON-APPOINTMENT (OUTPATIENT)
Age: 71
End: 2021-03-01

## 2021-03-09 ENCOUNTER — NON-APPOINTMENT (OUTPATIENT)
Age: 71
End: 2021-03-09

## 2021-03-11 ENCOUNTER — APPOINTMENT (OUTPATIENT)
Dept: HOME HEALTH SERVICES | Facility: HOME HEALTH | Age: 71
End: 2021-03-11
Payer: MEDICARE

## 2021-03-11 VITALS
HEART RATE: 89 BPM | OXYGEN SATURATION: 98 % | SYSTOLIC BLOOD PRESSURE: 160 MMHG | DIASTOLIC BLOOD PRESSURE: 70 MMHG | TEMPERATURE: 98.7 F

## 2021-03-11 PROCEDURE — 0031A: CPT

## 2021-05-06 ENCOUNTER — APPOINTMENT (OUTPATIENT)
Dept: HOME HEALTH SERVICES | Facility: HOME HEALTH | Age: 71
End: 2021-05-06
Payer: MEDICARE

## 2021-05-06 DIAGNOSIS — Z23 ENCOUNTER FOR IMMUNIZATION: ICD-10-CM

## 2021-05-06 PROCEDURE — 99350 HOME/RES VST EST HIGH MDM 60: CPT

## 2021-05-10 VITALS
TEMPERATURE: 98.1 F | RESPIRATION RATE: 16 BRPM | OXYGEN SATURATION: 99 % | DIASTOLIC BLOOD PRESSURE: 70 MMHG | HEART RATE: 82 BPM | SYSTOLIC BLOOD PRESSURE: 132 MMHG

## 2021-05-10 PROBLEM — Z23 COVID-19 VACCINE ADMINISTERED: Status: RESOLVED | Noted: 2021-03-11 | Resolved: 2021-05-10

## 2021-05-10 RX ORDER — BLOOD PRESSURE TEST KIT-MEDIUM
KIT MISCELLANEOUS
Qty: 1 | Refills: 0 | Status: DISCONTINUED | COMMUNITY
Start: 2018-12-04 | End: 2021-05-10

## 2021-05-10 RX ORDER — BLOOD SUGAR DIAGNOSTIC
STRIP MISCELLANEOUS
Qty: 1 | Refills: 0 | Status: DISCONTINUED | COMMUNITY
Start: 2019-06-11 | End: 2021-05-10

## 2021-05-10 RX ORDER — BLOOD-GLUCOSE METER
W/DEVICE KIT MISCELLANEOUS
Qty: 1 | Refills: 0 | Status: DISCONTINUED | COMMUNITY
Start: 2019-06-11 | End: 2021-05-10

## 2021-05-10 NOTE — COUNSELING
[Normal Weight - ( BMI  <25 )] : normal weight - ( BMI  <25 ) [DASH diet recommended] : DASH diet recommended [Smoker, not interested in quitting] : smoker, not interested in quitting [] : abdominal aortic ultrasound [Date: ___] : diabetic screening completed on [unfilled] [Improve mobility] : improve mobility [Improve weight] : improve weight [Discussed disease trajectory with patient/caregiver] : discussed disease trajectory with patient/caregiver [Completed Healthcare Proxy] : completed healthcare proxy [Completed DNR] : completed DNR [Completed Medical Orders for Life-Sustaining Treatment] : completed medical orders for life-sustaining treatment [Full Code] : Code Status: Full Code [No Limitations] : Treatment Guidelines: No limitations [Trial of Intubation] : Intubation: Trial of Intubation [[facility] Date: _____] [facility]last verification date: [unfilled] [_____] : HCP: [unfilled]

## 2021-05-10 NOTE — PHYSICAL EXAM
[No Acute Distress] : no acute distress [Well Nourished] : well nourished [Well Developed] : well developed [Normal Sclera/Conjunctiva] : normal sclera/conjunctiva [EOMI] : extra ocular movement intact [Normal Outer Ear/Nose] : the ears and nose were normal in appearance [Normal Oropharynx] : the oropharynx was normal [No Respiratory Distress] : no respiratory distress [Clear to Auscultation] : lungs were clear to auscultation bilaterally [No Accessory Muscle Use] : no accessory muscle use [Normal Rate] : heart rate was normal  [Regular Rhythm] : with a regular rhythm [Normal S1, S2] : normal S1 and S2 [No Murmurs] : no murmurs heard [No Edema] : there was no peripheral edema [Normal Bowel Sounds] : normal bowel sounds [Non Tender] : non-tender [Soft] : abdomen soft [Not Distended] : not distended [No CVA Tenderness] : no ~M costovertebral angle tenderness [No Spinal Tenderness] : no spinal tenderness [No Joint Swelling] : no joint swelling seen [No Clubbing, Cyanosis] : no clubbing  or cyanosis of the fingernails [Normal Strength/Tone] : muscle strength and tone were normal [No Rash] : no rash [No Skin Lesions] : no skin lesions [Oriented x3] : oriented to person, place, and time [Normal Affect] : the affect was normal [No JVD] : no jugular venous distention [de-identified] : looks older then previous visit, tearful over wife [de-identified] : ambulating with cane

## 2021-05-10 NOTE — HISTORY OF PRESENT ILLNESS
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 Patient has not been in close contact with someone covid positive. \par N95 mask, gloves, eye wear used during visit: [Y]. Total face to face time with patient is [35] min.\par \par PMH: Diabetes mellitus type 2 with neuropathy,  hypertension, hyperlipidemia, smoker,  BPH, \par \par Interval events: wife passed away two weeks ago\par \par Pt A&Ox3 tearful, reports he is really sad over death of wife but he has been taking his medications; also reports he saw the eye doctor and he has cataracts- has not been driving secondary to same\par \par DM: A1c 8.5 on 2/6  not checking blood sugars- on metformin, glipizide, trulicity \par \par CKD: Creat 1.00 GFR 89 in 2/20 \par \par HTN: on  amlodipine, losartan, chlorthalidone  \par \par HLD:  triglycerides, total cholesterol  normal;  LDL high in 2/21\par \par BPH: on doxazosin- denies urinary issues-\par \par Appetite good, has no constipation,

## 2021-05-18 ENCOUNTER — RX RENEWAL (OUTPATIENT)
Age: 71
End: 2021-05-18

## 2021-05-19 ENCOUNTER — RX RENEWAL (OUTPATIENT)
Age: 71
End: 2021-05-19

## 2021-05-25 NOTE — REVIEW OF SYSTEMS
[Negative] : Heme/Lymph [FreeTextEntry9] : as noted in HPI Peng Advancement Flap Text: The defect edges were debeveled with a #15 scalpel blade.  Given the location of the defect, shape of the defect and the proximity to free margins a Peng advancement flap was deemed most appropriate.  Using a sterile surgical marker, an appropriate advancement flap was drawn incorporating the defect and placing the expected incisions within the relaxed skin tension lines where possible. The area thus outlined was incised deep to adipose tissue with a #15 scalpel blade.  The skin margins were undermined to an appropriate distance in all directions utilizing iris scissors.

## 2021-06-23 ENCOUNTER — RX RENEWAL (OUTPATIENT)
Age: 71
End: 2021-06-23

## 2021-07-02 ENCOUNTER — APPOINTMENT (OUTPATIENT)
Dept: HOME HEALTH SERVICES | Facility: HOME HEALTH | Age: 71
End: 2021-07-02

## 2021-07-05 ENCOUNTER — FORM ENCOUNTER (OUTPATIENT)
Age: 71
End: 2021-07-05

## 2021-07-07 ENCOUNTER — NON-APPOINTMENT (OUTPATIENT)
Age: 71
End: 2021-07-07

## 2021-07-19 ENCOUNTER — RX RENEWAL (OUTPATIENT)
Age: 71
End: 2021-07-19

## 2021-07-21 ENCOUNTER — TRANSCRIPTION ENCOUNTER (OUTPATIENT)
Age: 71
End: 2021-07-21

## 2021-07-22 ENCOUNTER — NON-APPOINTMENT (OUTPATIENT)
Age: 71
End: 2021-07-22

## 2021-07-23 ENCOUNTER — NON-APPOINTMENT (OUTPATIENT)
Age: 71
End: 2021-07-23

## 2021-08-04 ENCOUNTER — NON-APPOINTMENT (OUTPATIENT)
Age: 71
End: 2021-08-04

## 2021-08-06 ENCOUNTER — APPOINTMENT (OUTPATIENT)
Dept: HOME HEALTH SERVICES | Facility: HOME HEALTH | Age: 71
End: 2021-08-06
Payer: MEDICARE

## 2021-08-06 VITALS
SYSTOLIC BLOOD PRESSURE: 170 MMHG | RESPIRATION RATE: 16 BRPM | OXYGEN SATURATION: 97 % | TEMPERATURE: 98.1 F | HEART RATE: 79 BPM | DIASTOLIC BLOOD PRESSURE: 92 MMHG

## 2021-08-06 DIAGNOSIS — M47.816 SPONDYLOSIS W/OUT MYELOPATHY OR RADICULOPATHY, LUMBAR REGION: ICD-10-CM

## 2021-08-06 PROCEDURE — 99349 HOME/RES VST EST MOD MDM 40: CPT

## 2021-08-10 PROBLEM — M47.816 ARTHRITIS, LUMBAR SPINE: Status: ACTIVE | Noted: 2021-08-10

## 2021-08-10 NOTE — COUNSELING
[Normal Weight - ( BMI  <25 )] : normal weight - ( BMI  <25 ) [DASH diet recommended] : DASH diet recommended [Smoker, not interested in quitting] : smoker, not interested in quitting [] : abdominal aortic ultrasound [Date: ___] : diabetic screening completed on [unfilled] [Improve mobility] : improve mobility [Improve weight] : improve weight [Discussed disease trajectory with patient/caregiver] : discussed disease trajectory with patient/caregiver [Completed Healthcare Proxy] : completed healthcare proxy [Completed DNR] : completed DNR [Completed Medical Orders for Life-Sustaining Treatment] : completed medical orders for life-sustaining treatment [Full Code] : Code Status: Full Code [No Limitations] : Treatment Guidelines: No limitations [Trial of Intubation] : Intubation: Trial of Intubation [_____] : HCP: [unfilled] [[facility]: _____] [facility]/last verification date: [unfilled]

## 2021-08-14 NOTE — PHYSICAL EXAM
[No Acute Distress] : no acute distress [Well Nourished] : well nourished [Well Developed] : well developed [Normal Sclera/Conjunctiva] : normal sclera/conjunctiva [EOMI] : extra ocular movement intact [Normal Outer Ear/Nose] : the ears and nose were normal in appearance [Normal Oropharynx] : the oropharynx was normal [No JVD] : no jugular venous distention [No Respiratory Distress] : no respiratory distress [Clear to Auscultation] : lungs were clear to auscultation bilaterally [No Accessory Muscle Use] : no accessory muscle use [Normal Rate] : heart rate was normal  [Regular Rhythm] : with a regular rhythm [Normal S1, S2] : normal S1 and S2 [No Murmurs] : no murmurs heard [No Edema] : there was no peripheral edema [Normal Bowel Sounds] : normal bowel sounds [Non Tender] : non-tender [Soft] : abdomen soft [Not Distended] : not distended [No CVA Tenderness] : no ~M costovertebral angle tenderness [No Spinal Tenderness] : no spinal tenderness [No Joint Swelling] : no joint swelling seen [No Clubbing, Cyanosis] : no clubbing  or cyanosis of the fingernails [Normal Strength/Tone] : muscle strength and tone were normal [No Rash] : no rash [No Skin Lesions] : no skin lesions [Oriented x3] : oriented to person, place, and time [Normal Affect] : the affect was normal [de-identified] : looks older then previous visit, remains tearful over wife [de-identified] : ambulating with cane

## 2021-08-14 NOTE — HISTORY OF PRESENT ILLNESS
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 Patient has not been in close contact with someone covid positive. \par N95 mask, gloves, eye wear used during visit: [Y]. Total face to face time with patient is [15] min.\par \par PMH: Diabetes mellitus type 2 with neuropathy,  hypertension, hyperlipidemia, smoker,  BPH, \par \par Interval events:  called office to report back pain - LS spine xray revealed arthritis- prescribed gabapentin for pain but does not take medications consistently \par \par Pt A&Ox3, reports she still misses his wife who passed away in April and he is sad over it\par Appetite is good\par \par DM: A1c 8.5 on 2/6  not checking blood sugars- on metformin, glipizide, trulicity \par \par CKD: Creat 1.00 GFR 89 in 2/20 \par \par HTN: on  amlodipine, losartan, chlorthalidone  \par \par HLD:  triglycerides, total cholesterol  normal;  LDL high in 2/21\par \par BPH: on doxazosin- denies urinary issues-\par \par Appetite good, has no constipation,

## 2021-08-14 NOTE — HEALTH RISK ASSESSMENT
[HRA Reviewed] : Health risk assessment reviewed [Independent] : managing finances [Some assistance needed] : managing medications [No falls in past year] : Patient reported no falls in the past year [No] : The patient does not have visual impairment [TimeGetUpGo] : 15

## 2021-09-23 ENCOUNTER — NON-APPOINTMENT (OUTPATIENT)
Age: 71
End: 2021-09-23

## 2021-10-20 ENCOUNTER — NON-APPOINTMENT (OUTPATIENT)
Age: 71
End: 2021-10-20

## 2021-10-21 ENCOUNTER — APPOINTMENT (OUTPATIENT)
Dept: HOME HEALTH SERVICES | Facility: HOME HEALTH | Age: 71
End: 2021-10-21

## 2021-11-01 ENCOUNTER — APPOINTMENT (OUTPATIENT)
Dept: HOME HEALTH SERVICES | Facility: HOME HEALTH | Age: 71
End: 2021-11-01
Payer: MEDICARE

## 2021-11-01 VITALS
TEMPERATURE: 98.1 F | OXYGEN SATURATION: 98 % | RESPIRATION RATE: 16 BRPM | SYSTOLIC BLOOD PRESSURE: 170 MMHG | DIASTOLIC BLOOD PRESSURE: 80 MMHG | HEART RATE: 78 BPM

## 2021-11-01 DIAGNOSIS — G62.9 POLYNEUROPATHY, UNSPECIFIED: ICD-10-CM

## 2021-11-01 PROCEDURE — 99349 HOME/RES VST EST MOD MDM 40: CPT

## 2021-11-01 PROCEDURE — G0008: CPT

## 2021-11-01 PROCEDURE — 90662 IIV NO PRSV INCREASED AG IM: CPT

## 2021-11-01 NOTE — COUNSELING
[Normal Weight - ( BMI  <25 )] : normal weight - ( BMI  <25 ) [DASH diet recommended] : DASH diet recommended [Smoker, not interested in quitting] : smoker, not interested in quitting [] : abdominal aortic ultrasound [Date: ___] : diabetic screening completed on [unfilled] [Improve mobility] : improve mobility [Improve weight] : improve weight [Discussed disease trajectory with patient/caregiver] : discussed disease trajectory with patient/caregiver [Completed Healthcare Proxy] : completed healthcare proxy [Completed DNR] : completed DNR [Completed Medical Orders for Life-Sustaining Treatment] : completed medical orders for life-sustaining treatment [Full Code] : Code Status: Full Code [No Limitations] : Treatment Guidelines: No limitations [Trial of Intubation] : Intubation: Trial of Intubation [Last Verification Date: _____] [facility]/last verification date: [unfilled] [_____] : HCP: [unfilled]

## 2021-11-01 NOTE — HISTORY OF PRESENT ILLNESS
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 Patient has not been in close contact with someone covid positive. \par N95 mask, gloves, eye wear used during visit: [Y]. Total face to face time with patient is [15] min.\par \par PMH: Diabetes mellitus type 2 with neuropathy,  hypertension, hyperlipidemia, smoker,  BPH, \par \par Interval events:  none\par \par Pt A&Ox3, reports memory has been bad- denies any increase in ETOH or marijuana use; has been compliant with medications w/ help of grandson who reports he only gives medications in afternoon\par Appetite is good\par \par DM: A1c 8.5 on 2/21  not checking blood sugars- on metformin, glipizide, trulicity \par \par CKD: Creat 1.00 GFR 89 in 2/21\par \par HTN: on  amlodipine, losartan, chlorthalidone  \par \par HLD:  triglycerides, total cholesterol  normal;  LDL high in 2/21\par \par BPH: on doxazosin- denies urinary issues-\par \par Appetite good, has no constipation,

## 2021-11-01 NOTE — PHYSICAL EXAM
[No Acute Distress] : no acute distress [Well Nourished] : well nourished [Well Developed] : well developed [Normal Sclera/Conjunctiva] : normal sclera/conjunctiva [EOMI] : extra ocular movement intact [Normal Outer Ear/Nose] : the ears and nose were normal in appearance [Normal Oropharynx] : the oropharynx was normal [No JVD] : no jugular venous distention [No Respiratory Distress] : no respiratory distress [Clear to Auscultation] : lungs were clear to auscultation bilaterally [No Accessory Muscle Use] : no accessory muscle use [Normal Rate] : heart rate was normal  [Regular Rhythm] : with a regular rhythm [Normal S1, S2] : normal S1 and S2 [No Murmurs] : no murmurs heard [No Edema] : there was no peripheral edema [Normal Bowel Sounds] : normal bowel sounds [Non Tender] : non-tender [Soft] : abdomen soft [Not Distended] : not distended [No CVA Tenderness] : no ~M costovertebral angle tenderness [No Spinal Tenderness] : no spinal tenderness [No Joint Swelling] : no joint swelling seen [No Clubbing, Cyanosis] : no clubbing  or cyanosis of the fingernails [Normal Strength/Tone] : muscle strength and tone were normal [No Rash] : no rash [No Skin Lesions] : no skin lesions [Oriented x3] : oriented to person, place, and time [Normal Affect] : the affect was normal [de-identified] : looks well  [de-identified] : ambulating with cane

## 2021-11-01 NOTE — HEALTH RISK ASSESSMENT
[Independent] : managing finances [Some assistance needed] : managing medications [No falls in past year] : Patient reported no falls in the past year [No] : The patient does not have visual impairment [HRA Reviewed] : Health risk assessments reviewed [TimeGetUpGo] : 15

## 2021-11-09 LAB
25(OH)D3 SERPL-MCNC: 22.9 NG/ML
ALBUMIN SERPL ELPH-MCNC: 4.4 G/DL
ALP BLD-CCNC: 96 U/L
ALT SERPL-CCNC: 20 U/L
ANION GAP SERPL CALC-SCNC: 14 MMOL/L
AST SERPL-CCNC: 19 U/L
BASOPHILS # BLD AUTO: 0.03 K/UL
BASOPHILS NFR BLD AUTO: 0.6 %
BILIRUB SERPL-MCNC: 0.6 MG/DL
BUN SERPL-MCNC: 11 MG/DL
CALCIUM SERPL-MCNC: 9.8 MG/DL
CHLORIDE SERPL-SCNC: 96 MMOL/L
CHOLEST SERPL-MCNC: 197 MG/DL
CO2 SERPL-SCNC: 27 MMOL/L
CREAT SERPL-MCNC: 0.86 MG/DL
EOSINOPHIL # BLD AUTO: 0.16 K/UL
EOSINOPHIL NFR BLD AUTO: 3.4 %
ESTIMATED AVERAGE GLUCOSE: 237 MG/DL
GLUCOSE SERPL-MCNC: 234 MG/DL
HBA1C MFR BLD HPLC: 9.9 %
HCT VFR BLD CALC: 42.3 %
HDLC SERPL-MCNC: 57 MG/DL
HGB BLD-MCNC: 14.1 G/DL
IMM GRANULOCYTES NFR BLD AUTO: 0.2 %
LDLC SERPL CALC-MCNC: 112 MG/DL
LYMPHOCYTES # BLD AUTO: 2.16 K/UL
LYMPHOCYTES NFR BLD AUTO: 45.4 %
MAN DIFF?: NORMAL
MCHC RBC-ENTMCNC: 27.1 PG
MCHC RBC-ENTMCNC: 33.3 GM/DL
MCV RBC AUTO: 81.2 FL
MONOCYTES # BLD AUTO: 0.42 K/UL
MONOCYTES NFR BLD AUTO: 8.8 %
NEUTROPHILS # BLD AUTO: 1.98 K/UL
NEUTROPHILS NFR BLD AUTO: 41.6 %
NONHDLC SERPL-MCNC: 140 MG/DL
PLATELET # BLD AUTO: 209 K/UL
POTASSIUM SERPL-SCNC: 4.9 MMOL/L
PROT SERPL-MCNC: 7.2 G/DL
RBC # BLD: 5.21 M/UL
RBC # FLD: 14.4 %
SODIUM SERPL-SCNC: 137 MMOL/L
TRIGL SERPL-MCNC: 141 MG/DL
TSH SERPL-ACNC: 1.02 UIU/ML
WBC # FLD AUTO: 4.76 K/UL

## 2021-11-16 ENCOUNTER — RX RENEWAL (OUTPATIENT)
Age: 71
End: 2021-11-16

## 2021-11-23 ENCOUNTER — NON-APPOINTMENT (OUTPATIENT)
Age: 71
End: 2021-11-23

## 2021-11-26 ENCOUNTER — NON-APPOINTMENT (OUTPATIENT)
Age: 71
End: 2021-11-26

## 2021-12-03 ENCOUNTER — NON-APPOINTMENT (OUTPATIENT)
Age: 71
End: 2021-12-03

## 2021-12-06 ENCOUNTER — NON-APPOINTMENT (OUTPATIENT)
Age: 71
End: 2021-12-06

## 2021-12-09 ENCOUNTER — NON-APPOINTMENT (OUTPATIENT)
Age: 71
End: 2021-12-09

## 2021-12-13 ENCOUNTER — NON-APPOINTMENT (OUTPATIENT)
Age: 71
End: 2021-12-13

## 2021-12-15 ENCOUNTER — NON-APPOINTMENT (OUTPATIENT)
Age: 71
End: 2021-12-15

## 2021-12-16 ENCOUNTER — RX RENEWAL (OUTPATIENT)
Age: 71
End: 2021-12-16

## 2021-12-17 ENCOUNTER — APPOINTMENT (OUTPATIENT)
Dept: HOME HEALTH SERVICES | Facility: HOME HEALTH | Age: 71
End: 2021-12-17

## 2021-12-17 ENCOUNTER — NON-APPOINTMENT (OUTPATIENT)
Age: 71
End: 2021-12-17

## 2021-12-17 ENCOUNTER — MED ADMIN CHARGE (OUTPATIENT)
Age: 71
End: 2021-12-17

## 2021-12-17 VITALS — HEART RATE: 71 BPM | SYSTOLIC BLOOD PRESSURE: 160 MMHG | DIASTOLIC BLOOD PRESSURE: 74 MMHG

## 2021-12-17 VITALS
TEMPERATURE: 98.7 F | DIASTOLIC BLOOD PRESSURE: 90 MMHG | SYSTOLIC BLOOD PRESSURE: 170 MMHG | HEART RATE: 87 BPM | OXYGEN SATURATION: 97 %

## 2021-12-17 RX ORDER — MUPIROCIN 20 MG/G
2 OINTMENT TOPICAL 3 TIMES DAILY
Qty: 22 | Refills: 0 | Status: DISCONTINUED | COMMUNITY
Start: 2021-03-01 | End: 2021-12-17

## 2021-12-20 ENCOUNTER — NON-APPOINTMENT (OUTPATIENT)
Age: 71
End: 2021-12-20

## 2021-12-22 ENCOUNTER — NON-APPOINTMENT (OUTPATIENT)
Age: 71
End: 2021-12-22

## 2022-01-10 ENCOUNTER — NON-APPOINTMENT (OUTPATIENT)
Age: 72
End: 2022-01-10

## 2022-01-10 ENCOUNTER — APPOINTMENT (OUTPATIENT)
Dept: HOME HEALTH SERVICES | Facility: HOME HEALTH | Age: 72
End: 2022-01-10

## 2022-01-10 VITALS
OXYGEN SATURATION: 96 % | SYSTOLIC BLOOD PRESSURE: 156 MMHG | HEART RATE: 82 BPM | DIASTOLIC BLOOD PRESSURE: 84 MMHG | RESPIRATION RATE: 18 BRPM | TEMPERATURE: 98.4 F

## 2022-03-10 NOTE — PHYSICAL EXAM
Quality 110: Preventive Care And Screening: Influenza Immunization: Influenza Immunization not Administered because Patient Refused. Detail Level: Detailed [No Acute Distress] : no acute distress [Well Nourished] : well nourished [Well Developed] : well developed [Normal Sclera/Conjunctiva] : normal sclera/conjunctiva [EOMI] : extra ocular movement intact [Normal Outer Ear/Nose] : the ears and nose were normal in appearance [Normal Oropharynx] : the oropharynx was normal [No Respiratory Distress] : no respiratory distress [Clear to Auscultation] : lungs were clear to auscultation bilaterally [No Accessory Muscle Use] : no accessory muscle use [Normal Rate] : heart rate was normal  [Regular Rhythm] : with a regular rhythm [Normal S1, S2] : normal S1 and S2 [No Murmurs] : no murmurs heard [No Edema] : there was no peripheral edema [Normal Bowel Sounds] : normal bowel sounds [Non Tender] : non-tender [Soft] : abdomen soft [Not Distended] : not distended [Normal Post Cervical Nodes] : no posterior cervical lymphadenopathy [Normal Anterior Cervical Nodes] : no anterior cervical lymphadenopathy [No CVA Tenderness] : no ~M costovertebral angle tenderness [No Spinal Tenderness] : no spinal tenderness [Normal Gait] : normal gait [Normal Strength/Tone] : muscle strength and tone were normal [No Rash] : no rash [No Skin Lesions] : no skin lesions [Oriented x3] : oriented to person, place, and time [Normal Affect] : the affect was normal [de-identified] : (+) phalen's sign

## 2022-03-30 ENCOUNTER — NON-APPOINTMENT (OUTPATIENT)
Age: 72
End: 2022-03-30

## 2022-03-31 ENCOUNTER — APPOINTMENT (OUTPATIENT)
Dept: HOME HEALTH SERVICES | Facility: HOME HEALTH | Age: 72
End: 2022-03-31

## 2022-03-31 ENCOUNTER — APPOINTMENT (OUTPATIENT)
Dept: HOME HEALTH SERVICES | Facility: HOME HEALTH | Age: 72
End: 2022-03-31
Payer: MEDICARE

## 2022-03-31 VITALS
HEART RATE: 92 BPM | DIASTOLIC BLOOD PRESSURE: 94 MMHG | TEMPERATURE: 98.7 F | RESPIRATION RATE: 18 BRPM | OXYGEN SATURATION: 99 % | SYSTOLIC BLOOD PRESSURE: 184 MMHG

## 2022-03-31 DIAGNOSIS — Z92.29 PERSONAL HISTORY OF OTHER DRUG THERAPY: ICD-10-CM

## 2022-03-31 DIAGNOSIS — N40.0 BENIGN PROSTATIC HYPERPLASIA WITHOUT LOWER URINARY TRACT SYMPMS: ICD-10-CM

## 2022-03-31 PROCEDURE — 99350 HOME/RES VST EST HIGH MDM 60: CPT

## 2022-04-06 PROBLEM — Z92.29 HISTORY OF INFLUENZA VACCINATION: Status: RESOLVED | Noted: 2019-10-15 | Resolved: 2022-04-06

## 2022-04-06 NOTE — COUNSELING
[Normal Weight - ( BMI  <25 )] : normal weight - ( BMI  <25 ) [DASH diet recommended] : DASH diet recommended [Smoker, not interested in quitting] : smoker, not interested in quitting [] : abdominal aortic ultrasound [Date: ___] : diabetic screening completed on [unfilled] [Improve mobility] : improve mobility [Improve weight] : improve weight [Discussed disease trajectory with patient/caregiver] : discussed disease trajectory with patient/caregiver [Completed Healthcare Proxy] : completed healthcare proxy [Completed DNR] : completed DNR [Completed Medical Orders for Life-Sustaining Treatment] : completed medical orders for life-sustaining treatment [Full Code] : Code Status: Full Code [No Limitations] : Treatment Guidelines: No limitations [Trial of Intubation] : Intubation: Trial of Intubation [[facility]: _____] [facility]last verification date: [unfilled] [_____] : HCP: [unfilled]

## 2022-04-06 NOTE — REVIEW OF SYSTEMS
[Negative] : Heme/Lymph [FreeTextEntry2] : as noted in HPI [FreeTextEntry9] : as noted in HPI [de-identified] : as noted in HPI

## 2022-04-06 NOTE — HISTORY OF PRESENT ILLNESS
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 Patient has not been in close contact with someone covid positive. \par N95 mask, gloves, eye wear used during visit: [Y]. Total face to face time with patient is [15] min.\par \par PMH: Diabetes mellitus type 2 with neuropathy,  hypertension, hyperlipidemia, smoker,  BPH, \par \par Interval events:  to have (R) cataract surgery - does not have a date\par \par Pt A&Ox3, still misses his  wife\par Daughter has been setting up medications but he is still not taking them consistently; only took AM meds at 4pm; has not taken PM meds including second metformin or gabapentin "in weeks"; does not take Trulicity because he does not like needles- which he is just admitting to\par Appetite is good\par Denies constipation\par Has no urinary issues\par \par DM: as above; A1c 9.9 in   not checking blood sugars-  metformin, glipizide, Trulicity prescribed \par \par CKD: Creat 1.02 GFR 86 in  \par \par HTN: on  amlodipine, losartan, chlorthalidone  \par \par HLD:  triglycerides, total cholesterol  normal;  LDL high in \par \par BPH: on doxazosin- denies urinary issues-\par \par Appetite good, has no constipation,

## 2022-04-06 NOTE — PHYSICAL EXAM
[No Acute Distress] : no acute distress [Well Nourished] : well nourished [Well Developed] : well developed [Normal Sclera/Conjunctiva] : normal sclera/conjunctiva [EOMI] : extra ocular movement intact [Normal Outer Ear/Nose] : the ears and nose were normal in appearance [Normal Oropharynx] : the oropharynx was normal [No JVD] : no jugular venous distention [No Respiratory Distress] : no respiratory distress [Clear to Auscultation] : lungs were clear to auscultation bilaterally [No Accessory Muscle Use] : no accessory muscle use [Normal Rate] : heart rate was normal  [Regular Rhythm] : with a regular rhythm [Normal S1, S2] : normal S1 and S2 [No Murmurs] : no murmurs heard [No Edema] : there was no peripheral edema [Normal Bowel Sounds] : normal bowel sounds [Non Tender] : non-tender [Soft] : abdomen soft [Not Distended] : not distended [No CVA Tenderness] : no ~M costovertebral angle tenderness [No Spinal Tenderness] : no spinal tenderness [No Joint Swelling] : no joint swelling seen [No Clubbing, Cyanosis] : no clubbing  or cyanosis of the fingernails [Normal Strength/Tone] : muscle strength and tone were normal [No Rash] : no rash [No Skin Lesions] : no skin lesions [Oriented x3] : oriented to person, place, and time [Normal Affect] : the affect was normal [de-identified] : looks tired  [de-identified] : ambulating with cane

## 2022-04-06 NOTE — REASON FOR VISIT
[Follow-Up] : a follow-up visit [Spouse] : spouse [Pre-Visit Preparation] : pre-visit preparation was done [Intercurrent Specialty/Sub-specialty Visits] : the patient has no intercurrent specialty/sub-specialty visits [FreeTextEntry1] : chronic conditions and presurgical clearance for (R) eye cataract

## 2022-04-08 ENCOUNTER — LABORATORY RESULT (OUTPATIENT)
Age: 72
End: 2022-04-08

## 2022-04-20 LAB
25(OH)D3 SERPL-MCNC: 25.6 NG/ML
ALBUMIN SERPL ELPH-MCNC: 4.6 G/DL
ALP BLD-CCNC: 102 U/L
ALT SERPL-CCNC: 16 U/L
ANION GAP SERPL CALC-SCNC: 12 MMOL/L
AST SERPL-CCNC: 16 U/L
BASOPHILS # BLD AUTO: 0.04 K/UL
BASOPHILS NFR BLD AUTO: 0.8 %
BILIRUB SERPL-MCNC: 0.5 MG/DL
BUN SERPL-MCNC: 15 MG/DL
CALCIUM SERPL-MCNC: 9.7 MG/DL
CHLORIDE SERPL-SCNC: 102 MMOL/L
CHOLEST SERPL-MCNC: 168 MG/DL
CO2 SERPL-SCNC: 26 MMOL/L
CREAT SERPL-MCNC: 1.02 MG/DL
EGFR: 79 ML/MIN/1.73M2
EOSINOPHIL # BLD AUTO: 0.18 K/UL
EOSINOPHIL NFR BLD AUTO: 3.6 %
ESTIMATED AVERAGE GLUCOSE: 177 MG/DL
HBA1C MFR BLD HPLC: 7.8 %
HCT VFR BLD CALC: 35.6 %
HDLC SERPL-MCNC: 50 MG/DL
HGB BLD-MCNC: 11.9 G/DL
IMM GRANULOCYTES NFR BLD AUTO: 0.2 %
LDLC SERPL CALC-MCNC: 101 MG/DL
LYMPHOCYTES # BLD AUTO: 1.88 K/UL
LYMPHOCYTES NFR BLD AUTO: 37.5 %
MAN DIFF?: NORMAL
MCHC RBC-ENTMCNC: 27.2 PG
MCHC RBC-ENTMCNC: 33.4 GM/DL
MCV RBC AUTO: 81.3 FL
MONOCYTES # BLD AUTO: 0.47 K/UL
MONOCYTES NFR BLD AUTO: 9.4 %
NEUTROPHILS # BLD AUTO: 2.44 K/UL
NEUTROPHILS NFR BLD AUTO: 48.5 %
NONHDLC SERPL-MCNC: 118 MG/DL
PLATELET # BLD AUTO: 229 K/UL
POTASSIUM SERPL-SCNC: 4.4 MMOL/L
PREALB SERPL NEPH-MCNC: 11 MG/DL
PROT SERPL-MCNC: 7.3 G/DL
RBC # BLD: 4.38 M/UL
RBC # FLD: 15 %
SODIUM SERPL-SCNC: 140 MMOL/L
TRIGL SERPL-MCNC: 85 MG/DL
TSH SERPL-ACNC: 0.1 UIU/ML
WBC # FLD AUTO: 5.02 K/UL

## 2022-05-09 ENCOUNTER — APPOINTMENT (OUTPATIENT)
Dept: HOME HEALTH SERVICES | Facility: HOME HEALTH | Age: 72
End: 2022-05-09
Payer: MEDICARE

## 2022-05-09 VITALS
DIASTOLIC BLOOD PRESSURE: 70 MMHG | HEART RATE: 78 BPM | OXYGEN SATURATION: 99 % | RESPIRATION RATE: 16 BRPM | TEMPERATURE: 98.1 F | SYSTOLIC BLOOD PRESSURE: 150 MMHG

## 2022-05-09 PROCEDURE — 99349 HOME/RES VST EST MOD MDM 40: CPT

## 2022-05-09 NOTE — COUNSELING
[Normal Weight - ( BMI  <25 )] : normal weight - ( BMI  <25 ) [DASH diet recommended] : DASH diet recommended [Smoker, not interested in quitting] : smoker, not interested in quitting [] : abdominal aortic ultrasound [Date: ___] : diabetic screening completed on [unfilled] [Improve mobility] : improve mobility [Improve weight] : improve weight [Discussed disease trajectory with patient/caregiver] : discussed disease trajectory with patient/caregiver [Completed Healthcare Proxy] : completed healthcare proxy [Completed DNR] : completed DNR [Completed Medical Orders for Life-Sustaining Treatment] : completed medical orders for life-sustaining treatment [Full Code] : Code Status: Full Code [No Limitations] : Treatment Guidelines: No limitations [Trial of Intubation] : Intubation: Trial of Intubation [[facility] Date: _____] [facility]/last verification date: [unfilled] [_____] : HCP: [unfilled]

## 2022-05-11 LAB
ALBUMIN SERPL ELPH-MCNC: 4.3 G/DL
ANION GAP SERPL CALC-SCNC: 12 MMOL/L
BASOPHILS # BLD AUTO: 0.03 K/UL
BASOPHILS NFR BLD AUTO: 0.5 %
BUN SERPL-MCNC: 11 MG/DL
CALCIUM SERPL-MCNC: 9.4 MG/DL
CHLORIDE SERPL-SCNC: 100 MMOL/L
CO2 SERPL-SCNC: 27 MMOL/L
CREAT SERPL-MCNC: 0.89 MG/DL
EGFR: 92 ML/MIN/1.73M2
EOSINOPHIL # BLD AUTO: 0.15 K/UL
EOSINOPHIL NFR BLD AUTO: 2.5 %
GLUCOSE SERPL-MCNC: 271 MG/DL
HCT VFR BLD CALC: 35.5 %
HGB BLD-MCNC: 12 G/DL
IMM GRANULOCYTES NFR BLD AUTO: 0.2 %
LYMPHOCYTES # BLD AUTO: 2.32 K/UL
LYMPHOCYTES NFR BLD AUTO: 38.5 %
MAN DIFF?: NORMAL
MCHC RBC-ENTMCNC: 27.2 PG
MCHC RBC-ENTMCNC: 33.8 GM/DL
MCV RBC AUTO: 80.5 FL
MONOCYTES # BLD AUTO: 0.49 K/UL
MONOCYTES NFR BLD AUTO: 8.1 %
NEUTROPHILS # BLD AUTO: 3.03 K/UL
NEUTROPHILS NFR BLD AUTO: 50.2 %
PHOSPHATE SERPL-MCNC: 2.3 MG/DL
PLATELET # BLD AUTO: 225 K/UL
POTASSIUM SERPL-SCNC: 3.6 MMOL/L
RBC # BLD: 4.41 M/UL
RBC # FLD: 14.3 %
SODIUM SERPL-SCNC: 139 MMOL/L
TSH SERPL-ACNC: 0.71 UIU/ML
WBC # FLD AUTO: 6.03 K/UL

## 2022-05-11 RX ORDER — DULAGLUTIDE 0.75 MG/.5ML
0.75 INJECTION, SOLUTION SUBCUTANEOUS
Qty: 12 | Refills: 8 | Status: DISCONTINUED | COMMUNITY
Start: 2018-12-04 | End: 2022-05-11

## 2022-05-11 NOTE — HISTORY OF PRESENT ILLNESS
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 Patient has not been in close contact with someone covid positive. \par N95 mask, gloves, eye wear used during visit: [Y]. Total face to face time with patient is [15] min.\par \par PMH: Diabetes mellitus type 2 with neuropathy,  hypertension, hyperlipidemia, smoker,  BPH, \par \par Interval events:  to have (R) cataract surgery - does not have a date\par \par Pt A&Ox3, with no c/o; \par Daughter setting up medications and grandson giving him the pills \par Appetite is good\par Denies constipation\par Has no urinary issues\par \par DM:  A1c 7.8 ON 4/8- still not checking blood sugars or taking Trulicity-  on metformin 1000mg BID, glipizide 10 daily,  \par CKD: Creat 1.02 GFR 79 on 4/8\par \par HTN: on  amlodipine, losartan, chlorthalidone  \par \par HLD:  triglycerides, total cholesterol  normal;  LDL high on 4/8 but decreased from previous- on atorvastatin 40\par \par BPH: on doxazosin- denies urinary issues-\par \par

## 2022-05-11 NOTE — DATA REVIEWED
[FreeTextEntry1] : as noted in HPI\par \par \par Addendum : EKG from 5/10/22- NSR rate 71\par left axis deviation\par incomplete RBBB

## 2022-05-11 NOTE — PHYSICAL EXAM
[No Acute Distress] : no acute distress [Well Nourished] : well nourished [Well Developed] : well developed [EOMI] : extra ocular movement intact [Normal Outer Ear/Nose] : the ears and nose were normal in appearance [Normal Oropharynx] : the oropharynx was normal [No JVD] : no jugular venous distention [No Respiratory Distress] : no respiratory distress [Clear to Auscultation] : lungs were clear to auscultation bilaterally [No Accessory Muscle Use] : no accessory muscle use [Normal Rate] : heart rate was normal  [Regular Rhythm] : with a regular rhythm [Normal S1, S2] : normal S1 and S2 [No Murmurs] : no murmurs heard [No Edema] : there was no peripheral edema [Normal Bowel Sounds] : normal bowel sounds [Non Tender] : non-tender [Soft] : abdomen soft [Not Distended] : not distended [No CVA Tenderness] : no ~M costovertebral angle tenderness [No Spinal Tenderness] : no spinal tenderness [No Joint Swelling] : no joint swelling seen [No Clubbing, Cyanosis] : no clubbing  or cyanosis of the fingernails [Normal Strength/Tone] : muscle strength and tone were normal [No Rash] : no rash [No Skin Lesions] : no skin lesions [Oriented x3] : oriented to person, place, and time [Normal Affect] : the affect was normal [Normal Sclera/Conjunctiva] : normal sclera/conjunctiva [de-identified] : looks well  [de-identified] : n [de-identified] : ambulating with cane

## 2022-05-11 NOTE — REVIEW OF SYSTEMS
[Negative] : Heme/Lymph [FreeTextEntry2] : as noted in HPI [FreeTextEntry9] : as noted in HPI [de-identified] : as noted in HPI

## 2022-06-17 ENCOUNTER — NON-APPOINTMENT (OUTPATIENT)
Age: 72
End: 2022-06-17

## 2022-06-23 ENCOUNTER — APPOINTMENT (OUTPATIENT)
Dept: HOME HEALTH SERVICES | Facility: HOME HEALTH | Age: 72
End: 2022-06-23

## 2022-06-23 VITALS
HEART RATE: 82 BPM | SYSTOLIC BLOOD PRESSURE: 150 MMHG | TEMPERATURE: 97.4 F | RESPIRATION RATE: 16 BRPM | DIASTOLIC BLOOD PRESSURE: 72 MMHG | OXYGEN SATURATION: 99 %

## 2022-06-23 DIAGNOSIS — G31.84 MILD COGNITIVE IMPAIRMENT, SO STATED: ICD-10-CM

## 2022-06-23 PROCEDURE — 99349 HOME/RES VST EST MOD MDM 40: CPT | Mod: 25

## 2022-06-23 PROCEDURE — 0004A: CPT

## 2022-06-27 PROBLEM — G31.84 MCI (MILD COGNITIVE IMPAIRMENT): Status: ACTIVE | Noted: 2022-05-19

## 2022-06-27 NOTE — REVIEW OF SYSTEMS
[Negative] : Psychiatric [FreeTextEntry2] : as noted in HPI [FreeTextEntry9] : as noted in HPI [de-identified] : as noted in HPI

## 2022-06-27 NOTE — ASSESSMENT
[FreeTextEntry1] : JOSE GARCIA Dec 13 1950 being screened for COVID-19 vaccine administration visit. \par Information received by [patient \par \par JOSE BRUNNER denies moderate to severe acute illness with or without fever.\par JOSE BRUNNER denies having a positive test for COVID-19 and denies being told by a doctor that he has COVID-19 in the past 14 days.\par JOSE BRUNNER denies receiving passive antibody therapy (monoclonal antibodies or convalescent plasma) as a treatment for COVID-19 in the past 90 days (3 months).\par \par JOSE BRUNNER denies ever having an immediate allergic reaction of any severity to a prior vaccine or injectable therapy, and denies anaphylaxis from any cause, including polysorbate-80 or polyethylene glycol (Miralax).\par JOSE BRUNNER's legal representative is: self\par \par \par YAO BURKETT educated JOSE BRUNNER or their legal representative about the Pfizer COVID-19 vaccine and provided the information sheet. \par \par YAO BURKETT educated JOSE BRUNNER or their legal representative that this vaccine booster is recommended to boost the vaccine's effectiveness.\par YAO BURKETT educated that there will be no cost to JOSE BRUNNER for this vaccine and that any monies or benefits for administering the vaccine will be assigned and transferred to the vaccinating provider, including benefits/monies from JOSE BRUNNER's  health plan, Medicare or other third parties who are financially responsible for their medical care. \par \par JOSE BRUNNER or their legal representative consents to the release of all information needed (including but not limited to medical records, copies of claims and itemized bills) to verify payment and as needed for other public health purposes, including reporting to applicable vaccine registries.\par \par YAO BURKETT provided JOSE BRUNNER or their legal representative a chance to ask questions and reviewed the benefits and risks of the vaccination as described. JOSE BRUNNER or their legal representative consents to the administration of the COVID-19 vaccination\par \par Patient screened as above.\par After consent verbally received, the Pfizer-BioNTech COVID19 vaccine vial was gently inverted 10 times, and in an upright position, using aseptic technique, a 0.3 mL dose was drawn up into a 1 mL syringe.  The [left] deltoid was prepped with alcohol swab, and vaccine was administered at a 90 degree angle.  A bandage was applied.\par Patient was monitored for [15] minutes afterwards.  Complications included: [none].\par \par

## 2022-06-27 NOTE — PHYSICAL EXAM
[No Acute Distress] : no acute distress [Well Nourished] : well nourished [Well Developed] : well developed [Normal Sclera/Conjunctiva] : normal sclera/conjunctiva [EOMI] : extra ocular movement intact [Normal Outer Ear/Nose] : the ears and nose were normal in appearance [Normal Oropharynx] : the oropharynx was normal [No JVD] : no jugular venous distention [No Respiratory Distress] : no respiratory distress [Clear to Auscultation] : lungs were clear to auscultation bilaterally [No Accessory Muscle Use] : no accessory muscle use [Normal Rate] : heart rate was normal  [Regular Rhythm] : with a regular rhythm [Normal S1, S2] : normal S1 and S2 [No Murmurs] : no murmurs heard [No Edema] : there was no peripheral edema [Normal Bowel Sounds] : normal bowel sounds [Non Tender] : non-tender [Soft] : abdomen soft [Not Distended] : not distended [No CVA Tenderness] : no ~M costovertebral angle tenderness [No Spinal Tenderness] : no spinal tenderness [No Joint Swelling] : no joint swelling seen [No Clubbing, Cyanosis] : no clubbing  or cyanosis of the fingernails [Normal Strength/Tone] : muscle strength and tone were normal [No Rash] : no rash [No Skin Lesions] : no skin lesions [Oriented x3] : oriented to person, place, and time [Normal Affect] : the affect was normal [No Motor Deficits] : the motor exam was normal [No Gross Sensory Deficits] : no gross sensory deficits [de-identified] : looks well, has some confusion [de-identified] : ambulating with cane

## 2022-06-27 NOTE — HISTORY OF PRESENT ILLNESS
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 Patient has not been in close contact with someone covid positive. \par N95 mask, gloves, eye wear used during visit: [Y]. Total face to face time with patient is [15] min.\par \par PMH: Diabetes mellitus type 2 with neuropathy,  hypertension, hyperlipidemia, smoker,  BPH, \par \par Interval events:  admitted to [facility] 5/17- 5/19 for acute CVA- has no residual issues except continuing MCI\par \par Also scheduled for (R) cataract surgery on 7/7- does not know if he has eye drops needed- will alert eye center\par \par Pt A&Ox 2-3, with no complaints \par Daughter still setting up medications and grandson giving him the pills \par Appetite is good\par Denies constipation\par Has no urinary issues\par \par DM:  A1c 7.8 ON 4/8- still not checking blood sugars or taking Trulicity-  on metformin 1000mg BID, glipizide 10 daily,  \par CKD: Creat 1.02 GFR 79 on 4/8\par \par HTN: on  amlodipine, losartan, chlorthalidone  \par \par HLD:  triglycerides, total cholesterol  normal;  LDL high on 4/8 but decreased from previous- on atorvastatin 40\par \par BPH: on doxazosin- denies urinary issues-\par \par

## 2022-07-07 ENCOUNTER — NON-APPOINTMENT (OUTPATIENT)
Age: 72
End: 2022-07-07

## 2022-07-31 ENCOUNTER — INPATIENT (INPATIENT)
Facility: HOSPITAL | Age: 72
LOS: 2 days | Discharge: HOME HEALTH | DRG: 86 | End: 2022-08-03
Attending: HOSPITALIST | Admitting: HOSPITALIST
Payer: MEDICARE

## 2022-07-31 VITALS
OXYGEN SATURATION: 100 % | WEIGHT: 190.04 LBS | HEART RATE: 18 BPM | DIASTOLIC BLOOD PRESSURE: 82 MMHG | TEMPERATURE: 98 F | SYSTOLIC BLOOD PRESSURE: 139 MMHG | HEIGHT: 68 IN | RESPIRATION RATE: 18 BRPM

## 2022-07-31 DIAGNOSIS — R55 SYNCOPE AND COLLAPSE: ICD-10-CM

## 2022-07-31 LAB
ALBUMIN SERPL ELPH-MCNC: 4 G/DL — SIGNIFICANT CHANGE UP (ref 3.3–5)
ALP SERPL-CCNC: 89 U/L — SIGNIFICANT CHANGE UP (ref 40–120)
ALT FLD-CCNC: 15 U/L — SIGNIFICANT CHANGE UP (ref 10–45)
ANION GAP SERPL CALC-SCNC: 11 MMOL/L — SIGNIFICANT CHANGE UP (ref 5–17)
APPEARANCE UR: CLEAR — SIGNIFICANT CHANGE UP
APTT BLD: 28.2 SEC — SIGNIFICANT CHANGE UP (ref 27.5–35.5)
AST SERPL-CCNC: 15 U/L — SIGNIFICANT CHANGE UP (ref 10–40)
BACTERIA # UR AUTO: NEGATIVE — SIGNIFICANT CHANGE UP
BASOPHILS # BLD AUTO: 0.04 K/UL — SIGNIFICANT CHANGE UP (ref 0–0.2)
BASOPHILS NFR BLD AUTO: 0.8 % — SIGNIFICANT CHANGE UP (ref 0–2)
BILIRUB SERPL-MCNC: 0.6 MG/DL — SIGNIFICANT CHANGE UP (ref 0.2–1.2)
BILIRUB UR-MCNC: NEGATIVE — SIGNIFICANT CHANGE UP
BLD GP AB SCN SERPL QL: NEGATIVE — SIGNIFICANT CHANGE UP
BUN SERPL-MCNC: 14 MG/DL — SIGNIFICANT CHANGE UP (ref 7–23)
CALCIUM SERPL-MCNC: 9.6 MG/DL — SIGNIFICANT CHANGE UP (ref 8.4–10.5)
CHLORIDE SERPL-SCNC: 102 MMOL/L — SIGNIFICANT CHANGE UP (ref 96–108)
CO2 SERPL-SCNC: 25 MMOL/L — SIGNIFICANT CHANGE UP (ref 22–31)
COLOR SPEC: YELLOW — SIGNIFICANT CHANGE UP
CREAT SERPL-MCNC: 0.89 MG/DL — SIGNIFICANT CHANGE UP (ref 0.5–1.3)
DIFF PNL FLD: NEGATIVE — SIGNIFICANT CHANGE UP
EGFR: 92 ML/MIN/1.73M2 — SIGNIFICANT CHANGE UP
EOSINOPHIL # BLD AUTO: 0.18 K/UL — SIGNIFICANT CHANGE UP (ref 0–0.5)
EOSINOPHIL NFR BLD AUTO: 3.7 % — SIGNIFICANT CHANGE UP (ref 0–6)
EPI CELLS # UR: 4 /HPF — SIGNIFICANT CHANGE UP
GLUCOSE SERPL-MCNC: 233 MG/DL — HIGH (ref 70–99)
GLUCOSE UR QL: NEGATIVE — SIGNIFICANT CHANGE UP
HCT VFR BLD CALC: 37.3 % — LOW (ref 39–50)
HGB BLD-MCNC: 12.8 G/DL — LOW (ref 13–17)
HYALINE CASTS # UR AUTO: 4 /LPF — HIGH (ref 0–2)
INR BLD: 1.19 RATIO — HIGH (ref 0.88–1.16)
KETONES UR-MCNC: NEGATIVE — SIGNIFICANT CHANGE UP
LEUKOCYTE ESTERASE UR-ACNC: ABNORMAL
LYMPHOCYTES # BLD AUTO: 1.82 K/UL — SIGNIFICANT CHANGE UP (ref 1–3.3)
LYMPHOCYTES # BLD AUTO: 37.6 % — SIGNIFICANT CHANGE UP (ref 13–44)
MCHC RBC-ENTMCNC: 27.5 PG — SIGNIFICANT CHANGE UP (ref 27–34)
MCHC RBC-ENTMCNC: 34.3 GM/DL — SIGNIFICANT CHANGE UP (ref 32–36)
MCV RBC AUTO: 80 FL — SIGNIFICANT CHANGE UP (ref 80–100)
MONOCYTES # BLD AUTO: 0.42 K/UL — SIGNIFICANT CHANGE UP (ref 0–0.9)
MONOCYTES NFR BLD AUTO: 8.7 % — SIGNIFICANT CHANGE UP (ref 2–14)
NEUTROPHILS # BLD AUTO: 2.38 K/UL — SIGNIFICANT CHANGE UP (ref 1.8–7.4)
NEUTROPHILS NFR BLD AUTO: 49.2 % — SIGNIFICANT CHANGE UP (ref 43–77)
NITRITE UR-MCNC: NEGATIVE — SIGNIFICANT CHANGE UP
NRBC # BLD: 0 /100 WBCS — SIGNIFICANT CHANGE UP (ref 0–0)
PH UR: 6 — SIGNIFICANT CHANGE UP (ref 5–8)
PLATELET # BLD AUTO: 210 K/UL — SIGNIFICANT CHANGE UP (ref 150–400)
POTASSIUM SERPL-MCNC: 3.5 MMOL/L — SIGNIFICANT CHANGE UP (ref 3.5–5.3)
POTASSIUM SERPL-SCNC: 3.5 MMOL/L — SIGNIFICANT CHANGE UP (ref 3.5–5.3)
PROT SERPL-MCNC: 7.2 G/DL — SIGNIFICANT CHANGE UP (ref 6–8.3)
PROT UR-MCNC: SIGNIFICANT CHANGE UP
PROTHROM AB SERPL-ACNC: 13.8 SEC — HIGH (ref 10.5–13.4)
RBC # BLD: 4.66 M/UL — SIGNIFICANT CHANGE UP (ref 4.2–5.8)
RBC # FLD: 14.4 % — SIGNIFICANT CHANGE UP (ref 10.3–14.5)
RBC CASTS # UR COMP ASSIST: 1 /HPF — SIGNIFICANT CHANGE UP (ref 0–4)
RH IG SCN BLD-IMP: POSITIVE — SIGNIFICANT CHANGE UP
SARS-COV-2 RNA SPEC QL NAA+PROBE: SIGNIFICANT CHANGE UP
SODIUM SERPL-SCNC: 138 MMOL/L — SIGNIFICANT CHANGE UP (ref 135–145)
SP GR SPEC: 1.02 — SIGNIFICANT CHANGE UP (ref 1.01–1.02)
UROBILINOGEN FLD QL: NEGATIVE — SIGNIFICANT CHANGE UP
WBC # BLD: 4.84 K/UL — SIGNIFICANT CHANGE UP (ref 3.8–10.5)
WBC # FLD AUTO: 4.84 K/UL — SIGNIFICANT CHANGE UP (ref 3.8–10.5)
WBC UR QL: 38 /HPF — HIGH (ref 0–5)

## 2022-07-31 PROCEDURE — 70450 CT HEAD/BRAIN W/O DYE: CPT | Mod: 26,MA,77

## 2022-07-31 PROCEDURE — 99285 EMERGENCY DEPT VISIT HI MDM: CPT

## 2022-07-31 PROCEDURE — 70450 CT HEAD/BRAIN W/O DYE: CPT | Mod: 26,MA

## 2022-07-31 PROCEDURE — 99221 1ST HOSP IP/OBS SF/LOW 40: CPT

## 2022-07-31 PROCEDURE — 71046 X-RAY EXAM CHEST 2 VIEWS: CPT | Mod: 26

## 2022-07-31 RX ORDER — LEVETIRACETAM 250 MG/1
500 TABLET, FILM COATED ORAL ONCE
Refills: 0 | Status: COMPLETED | OUTPATIENT
Start: 2022-07-31 | End: 2022-07-31

## 2022-07-31 RX ADMIN — LEVETIRACETAM 400 MILLIGRAM(S): 250 TABLET, FILM COATED ORAL at 20:00

## 2022-07-31 NOTE — ED ADULT NURSE NOTE - OBJECTIVE STATEMENT
Pt is a 72 y/o male with PMH of arthritis, DM, HLD, HTN presenting to the ED with c/o headache starting today. Pt was seen at [facility] a few days ago, d/c yesterday after being admitted for subdural hematoma. Pt fell last Wednesday. Pt is a 70 y/o male with PMH of arthritis, DM, HLD, HTN presenting to the ED via EMS with c/o headache starting today. Pt was seen at [facility] a few days ago, d/c yesterday after being admitted for subdural hematoma. Pt fell last Wednesday, started complaining of headache today, pt states "my head feels weird." Pt states pain is very minimal in ED. 18 G in LAC by EMS. Pt normally ambulates with cane. Pt A&Ox4, speaking coherently, breathing unlabored on RA, denies SOB, denies chest pain, peripheral pulses intact, neuro intact, no neuro deficits noted, hand grasp equal and strong bilaterally, upper and lower extremities strong equal bilaterally, no facial droop noted, reports slight headache, denies dizziness/vision changes, PERRLA, denies n/v/d, denies urinary symptoms, denies fever, cough, chills. Cardiac monitoring initiated, labs sent, side rails raised, comfort and safety maintained.

## 2022-07-31 NOTE — CONSULT NOTE ADULT - SUBJECTIVE AND OBJECTIVE BOX
p ([id number])     HPI:  71M on ASA81 for general health. PMH HTN/HLD, T2DM, arthritis. S/p syncopal fall 2 days ago w/ headstrike. Was originally admitted to [facility] for ICH but got d/c'd. Presenting back to ED for worsening dizziness/light-headedness w/ mild HA. CT shows small 3mm Left frontal parasagittal traumatic IPH. Exam: AOx3, PERRL, EOMI, no facial/drift, CROFT 5/5    =====================  PAST MEDICAL HISTORY   DM (diabetes mellitus)    Arthritis    COVID-19 vaccine series completed    SDH (subdural hematoma)    HTN (hypertension)    HLD (hyperlipidemia)      PAST SURGICAL HISTORY   No significant past surgical history          MEDICATIONS:  Antibiotics:    Neuro:    Other:      SOCIAL HISTORY:   Occupation:   Marital Status:     FAMILY HISTORY:      ROS: Negative except per HPI    LABS:  PT/INR - ( 31 Jul 2022 16:07 )   PT: 13.8 sec;   INR: 1.19 ratio         PTT - ( 31 Jul 2022 16:07 )  PTT:28.2 sec                        12.8   4.84  )-----------( 210      ( 31 Jul 2022 16:07 )             37.3     07-31    138  |  102  |  14  ----------------------------<  233<H>  3.5   |  25  |  0.89    Ca    9.6      31 Jul 2022 16:07    TPro  7.2  /  Alb  4.0  /  TBili  0.6  /  DBili  x   /  AST  15  /  ALT  15  /  AlkPhos  89  07-31

## 2022-07-31 NOTE — ED PROVIDER NOTE - PHYSICAL EXAMINATION
General appearance: NAD, conversant, afebrile    Eyes: anicteric sclerae, NELDA, EOMI   HENT: Atraumatic; oropharynx clear, MMM and no ulcerations, no pharyngeal erythema or exudate   Neck: Trachea midline; Full range of motion, supple   Pulm: CTA bl, normal respiratory effort and no intercostal retractions, normal work of breathing   CV: RRR, No murmurs, rubs, or gallops.    Abdomen: Soft, non-tender, non-distended; no guarding or rebound   Extremities: No peripheral edema or extremity lymphadenopathy. 5/5 strength in all four extremities.   Skin: Dry, normal temperature, turgor and texture; no rash, ulcers or subcutaneous nodules   Psych: Appropriate affect, cooperative; alert and oriented to person, place and time

## 2022-07-31 NOTE — CONSULT NOTE ADULT - ASSESSMENT
71M on ASA81 for general health. PMH HTN/HLD, T2DM, arthritis. S/p syncopal fall 2 days ago w/ headstrike. Was originally admitted to [facility] for ICH but got d/c'd. Presenting back to ED for worsening dizziness/light-headedness w/ mild HA. CT shows small 3mm Left frontal parasagittal traumatic IPH. Exam: AOx3, PERRL, EOMI, no facial/drift, CROFT 5/5  -4hr repeat CT is stable  -Rec adm Medicine for syncopal w/u, q4h neurochecks  -Plt wnl, INR 1.19. Hold AC/AP, and get ARU lab  -Keppra 500BID x5d  -No need for ddAVP  -Can start DVT chemoppx 24hrs after a stable 12-24hr repeat CTH

## 2022-07-31 NOTE — ED PROVIDER NOTE - OBJECTIVE STATEMENT
70yo M w/ htn, dm, arthritis presenting with dizziness. Pt fell a couple days ago, was admitted to [facility] for SDH. Pt was discharged on keppra. Pt does not know if he takes any AC. Pt poor historian. He came back because he had some headache and felt dizzy, head doesn't "feel right". No fever, cp, sob, abd pain, n/v.

## 2022-07-31 NOTE — CONSULT NOTE ADULT - ATTENDING COMMENTS
seen in ED, Briefly   71M RH M  w/ HTN, HLD, T2DM, arthritis, CVA (R MCA, residual L hemiparesis) presents for headache. Pt reports a recent admission to [facility] 2 days ago for fall w/ headstrike, found to have an ICH, reportedly a SDH on discharge paperwork from [facility]. Pt reports that he was walking down the stairs and tried to hold onto the railing but fell, unclear if LOC episode prior to fall. Pt describes waking up with a headache this morning 7/31, mild, top of head, now resolved in the ED. At baseline, pt ambulates with a cane (had a work accident with a tractor in 2007). Pt does not know his medications, possibly on aspirin 81mg daily. Pt follows with Dr. Abundio Patterson, outpatient neurologist, for injuries after his work accident. CT shows small 3mm Left frontal parasagittal IPH, chronic R MCA stroke. Neuro exam notable for w/ LHH, 4+/5 L hemiparesis, some L dysmetria, bradyphrenia, bradykinesia, ?L/R confusion.    Impression: fall with headstrike, found to have L frontal parasaggital IPH, possibly traumatic, r/o vascular lesion vs. CAA. Chronic L hemiparesis and LHH likely 2/2 chronic R MCA stroke. prior R MCA stroke esus?     Recommendations:  - cardiac workup per primary team  - NPO unless passes dysphagia screen; otherwise, S/S eval.   - MRI brain w/w/o contrast   - MRA head and neck  - Repeat CTH in 4H stable  - Repeat CTH in 24H (unless MRI brain done instead)   - BP Goal: <160/90   - Mechanical DVT ppx; can start pharmacologic DVT ppx in 24 hours if scans stable  - Hold all antiplatelets and anticoagulants; Time to resume dependent on repeat imaging ; will eventaully need asa for secondary stroke prevention.  prior R MCA stroke, ESUS?   - PT/OT  - nsgy consulted - keppra 500mg BID for 5 days  - when ready for discharge, outpatient neurology f/u with his outpatient neurologist Dr. Abundio Patterson or Dr. Vitor Beltre  - don with daughter in ED at bedside   Vitor Beltre MD  Vascular Neurology

## 2022-07-31 NOTE — ED PROVIDER NOTE - PROGRESS NOTE DETAILS
Endorsed to Dr ARGELIA Cabral MD, Facep Attending note (Michael): CT head report reviewed, and noted to have small IPH; is stable; gcs 15; consulting neurology and neurosurgery.

## 2022-07-31 NOTE — ED ADULT NURSE NOTE - NSIMPLEMENTINTERV_GEN_ALL_ED
Implemented All Fall with Harm Risk Interventions:  [place] to call system. Call bell, personal items and telephone within reach. Instruct patient to call for assistance. Room bathroom lighting operational. Non-slip footwear when patient is off stretcher. Physically safe environment: no spills, clutter or unnecessary equipment. Stretcher in lowest position, wheels locked, appropriate side rails in place. Provide visual cue, wrist band, yellow gown, etc. Monitor gait and stability. Monitor for mental status changes and reorient to person, place, and time. Review medications for side effects contributing to fall risk. Reinforce activity limits and safety measures with patient and family. Provide visual clues: red socks.

## 2022-07-31 NOTE — ED ADULT NURSE NOTE - CHIEF COMPLAINT QUOTE
s/p fall last Wednesday dx: subdural hematoma at [facility]  now c/o headache started today

## 2022-07-31 NOTE — CONSULT NOTE ADULT - ASSESSMENT
71M RH w/ HTN, HLD, T2DM, arthritis presents for headache. Pt reports a recent admission to [facility] 2 days ago for fall w/ headstrike, found to have an ICH, reportedly a SDH on discharge paperwork from [facility]. Pt reports that he was walking down the stairs and tried to hold onto the railing but fell, unclear if LOC episode prior to fall. Pt describes waking up with a headache this morning 7/31, mild, top of head, now resolved in the ED. At baseline, pt ambulates with a cane (had a work accident with a tractor in 2007). Pt does not know his medications, possibly on aspirin 81mg daily. Pt follows with Dr. Abundio Patterson, outpatient neurologist, for injuries after his work accident. CT shows small 3mm Left frontal parasagittal IPH. Neuro exam notable for w/ ?LHH, 4+/5 L hemiparesis, bradyphrenia, bradykinesia, ?L/R confusion.    Impression: fall with headstrike, found to have L frontal parasaggital IPH, possibly traumatic, r/o vascular lesion vs. CAA    Recommendations:  [] cardiac workup per primary team  [] NPO unless passes dysphagia screen; otherwise, S/S eval.   [] MRI brain w/w/o contrast   [x] Repeat CTH in 4H stable  [] Repeat CTH in 24H (unless MRI brain done instead)   [] BP Goal: <160/90   [] Mechanical DVT ppx; can start pharmacologic DVT ppx in 24 hours if scans stable  [] Hold all antiplatelets and anticoagulants; Time to resume dependent on repeat imaging   [] PT/OT  [] nsgy consulted - keppra 500mg BID for 5 days  [] when ready for discharge, outpatient neurology f/u with his outpatient neurologist Dr. Abundio Patterson or Dr. Vitor Beltre    Case to be seen and discussed with Dr. Beltre in AM 71M RH w/ HTN, HLD, T2DM, arthritis, CVA (R MCA, residual L hemiparesis) presents for headache. Pt reports a recent admission to [facility] 2 days ago for fall w/ headstrike, found to have an ICH, reportedly a SDH on discharge paperwork from [facility]. Pt reports that he was walking down the stairs and tried to hold onto the railing but fell, unclear if LOC episode prior to fall. Pt describes waking up with a headache this morning 7/31, mild, top of head, now resolved in the ED. At baseline, pt ambulates with a cane (had a work accident with a tractor in 2007). Pt does not know his medications, possibly on aspirin 81mg daily. Pt follows with Dr. Abundio Patterson, outpatient neurologist, for injuries after his work accident. CT shows small 3mm Left frontal parasagittal IPH, chronic R MCA stroke. Neuro exam notable for w/ LHH, 4+/5 L hemiparesis, bradyphrenia, bradykinesia, ?L/R confusion.    Impression: fall with headstrike, found to have L frontal parasaggital IPH, possibly traumatic, r/o vascular lesion vs. CAA. Chronic L hemiparesis and LHH likely 2/2 chronic R MCA stroke.     Recommendations:  [] cardiac workup per primary team  [] NPO unless passes dysphagia screen; otherwise, S/S eval.   [] MRI brain w/w/o contrast   [x] Repeat CTH in 4H stable  [] Repeat CTH in 24H (unless MRI brain done instead)   [] BP Goal: <160/90   [] Mechanical DVT ppx; can start pharmacologic DVT ppx in 24 hours if scans stable  [] Hold all antiplatelets and anticoagulants; Time to resume dependent on repeat imaging   [] PT/OT  [] nsgy consulted - keppra 500mg BID for 5 days  [] when ready for discharge, outpatient neurology f/u with his outpatient neurologist Dr. Abundio Patterson or Dr. Vitor Beltre    Case to be seen and discussed with Dr. Beltre in AM 71M RH w/ HTN, HLD, T2DM, arthritis, CVA (R MCA, residual L hemiparesis) presents for headache. Pt reports a recent admission to [facility] 2 days ago for fall w/ headstrike, found to have an ICH, reportedly a SDH on discharge paperwork from [facility]. Pt reports that he was walking down the stairs and tried to hold onto the railing but fell, unclear if LOC episode prior to fall. Pt describes waking up with a headache this morning 7/31, mild, top of head, now resolved in the ED. At baseline, pt ambulates with a cane (had a work accident with a tractor in 2007). Pt does not know his medications, possibly on aspirin 81mg daily. Pt follows with Dr. Abundio Patterson, outpatient neurologist, for injuries after his work accident. CT shows small 3mm Left frontal parasagittal IPH, chronic R MCA stroke. Neuro exam notable for w/ LHH, 4+/5 L hemiparesis, bradyphrenia, bradykinesia, ?L/R confusion.    Impression: fall with headstrike, found to have L frontal parasaggital IPH, possibly traumatic, r/o vascular lesion vs. CAA. Chronic L hemiparesis and LHH likely 2/2 chronic R MCA stroke.     Recommendations:  [] cardiac workup per primary team  [] NPO unless passes dysphagia screen; otherwise, S/S eval.   [] MRI brain w/w/o contrast   [] MRA head and neck  [x] Repeat CTH in 4H stable  [] Repeat CTH in 24H (unless MRI brain done instead)   [] BP Goal: <160/90   [] Mechanical DVT ppx; can start pharmacologic DVT ppx in 24 hours if scans stable  [] Hold all antiplatelets and anticoagulants; Time to resume dependent on repeat imaging   [] PT/OT  [] nsgy consulted - keppra 500mg BID for 5 days  [] when ready for discharge, outpatient neurology f/u with his outpatient neurologist Dr. Abundio Patterson or Dr. Vitor Beltre    Case to be seen and discussed with Dr. Beltre in AM

## 2022-07-31 NOTE — ED PROVIDER NOTE - NSICDXPASTMEDICALHX_GEN_ALL_CORE_FT
PAST MEDICAL HISTORY:  Arthritis     COVID-19 vaccine series completed     DM (diabetes mellitus) On metformin    HLD (hyperlipidemia)     HTN (hypertension)     SDH (subdural hematoma)

## 2022-07-31 NOTE — CONSULT NOTE ADULT - SUBJECTIVE AND OBJECTIVE BOX
HPI:  71M RH w/ HTN, HLD, T2DM, arthritis presents for headache. Pt reports a recent admission to [facility] 2 days ago for fall w/ headstrike, found to have an ICH, reportedly a SDH on discharge paperwork from [facility]. Pt reports that he was walking down the stairs and tried to hold onto the railing but fell, unclear if LOC episode prior to fall. Pt describes waking up with a headache this morning , mild, top of head, no clear positional component, no clear aggravating or relieving factors, now resolved in the ED. At baseline, pt ambulates with a cane (had a work accident with a tractor in ). Pt does not know his medications, possibly on aspirin 81mg daily. Pt follows with Dr. Abundio Patterson, outpatient neurologist, for injuries after his work accident. CT shows small 3mm Left frontal parasagittal traumatic IPH.    (Stroke only)  NIHSS: 3  preMRS: 2  ICH 0    REVIEW OF SYSTEMS    A 10-system ROS was performed and is negative except for those items noted above and/or in the HPI.    PAST MEDICAL & SURGICAL HISTORY:  DM (diabetes mellitus)  On metformin  Arthritis  COVID-19 vaccine series completed  SDH (subdural hematoma)  HTN (hypertension)  HLD (hyperlipidemia)    No significant past surgical history      FAMILY HISTORY:    SOCIAL HISTORY:   T/E/D:   Occupation:   Lives with:     MEDICATIONS (HOME):  Home Medications:    MEDICATIONS  (STANDING):    MEDICATIONS  (PRN):    ALLERGIES/INTOLERANCES:  Allergies  No Known Allergies    Intolerances    VITALS & EXAMINATION:  Vital Signs Last 24 Hrs  T(C): 36.8 (2022 19:45), Max: 37 (2022 17:45)  T(F): 98.3 (2022 19:45), Max: 98.6 (2022 17:45)  HR: 68 (2022 19:45) (18 - 90)  BP: 148/75 (2022 19:45) (139/70 - 148/75)  BP(mean): --  RR: 16 (2022 19:45) (16 - 18)  SpO2: 99% (2022 19:45) (97% - 100%)    Parameters below as of 2022 19:45  Patient On (Oxygen Delivery Method): room air      General:  Constitutional: Male, appears stated age, in no apparent distress including pain  Head: Normocephalic & atraumatic.  Respiratory: No increased work of breathing  Extremities: No cyanosis, clubbing, or edema.  Skin: No rashes, bruising, or discoloration.      Neurological (>12):  MS: Awake, alert, oriented to person, place, situation, time 22. Normal affect. Follows all commands, including crossed, two step commands but needs repetition and initially incorrect on first try. Bradyphrenia and bradykinesia. Fund of knowledge intact. Registration 3/3, recall 2/3 and 3/3 with prompting. Possible L/R confusion    Language: Speech is clear, fluent with good repetition & comprehension (able to name objects mask, thumb). Bradyphasia    CNs: Pupils (R = 3mm surgical minimally reactive, L = 3mm). VFF. EOMI no nystagmus, no diplopia. V1-3 intact to LT, well developed masseter muscles b/l. No facial asymmetry b/l, full eye closure strength b/l. Hearing grossly normal (rubbing fingers) b/l. Symmetric palate elevation in midline. Gag reflex deferred. Head turning & shoulder shrug intact b/l. Tongue midline, normal movements, no atrophy.    Motor: Normal muscle bulk. No noticeable tremor or seizure.               Deltoid	Biceps	Triceps	Wrist	Finger ABd	   R	5	5	5	5			5 	  L	4+	5	5	4+			5    	H-Flex	K-Flex	K-Ext	D-Flex	P-Flex  R	5	5		5	5	  	   L	5	5		5	5	      Sensation: Decreased to LT on LUE and LLE however possible L/R confusion    Cortical: Extinction on DSS (neglect): none    Reflexes:              Biceps(C5)       BR(C6)     Triceps(C7)               Patellar(L4)    Achilles(S1)    Plantar Resp  R	1	          1	             		        0		    2		Down   L	1	          1             		        0		    2		Down     Coordination: No dysmetria to FTN    Gait: deferred    LABORATORY:  CBC                       12.8   4.84  )-----------( 210      ( 2022 16:07 )             37.3     Chem -    138  |  102  |  14  ----------------------------<  233<H>  3.5   |  25  |  0.89    Ca    9.6      2022 16:07    TPro  7.2  /  Alb  4.0  /  TBili  0.6  /  DBili  x   /  AST  15  /  ALT  15  /  AlkPhos  89      LFTs LIVER FUNCTIONS - ( 2022 16:07 )  Alb: 4.0 g/dL / Pro: 7.2 g/dL / ALK PHOS: 89 U/L / ALT: 15 U/L / AST: 15 U/L / GGT: x           Coagulopathy PT/INR - ( 2022 16:07 )   PT: 13.8 sec;   INR: 1.19 ratio         PTT - ( 2022 16:07 )  PTT:28.2 sec  Lipid Panel   A1c   Cardiac enzymes     U/A Urinalysis Basic - ( 2022 21:31 )    Color: Yellow / Appearance: Clear / S.022 / pH: x  Gluc: x / Ketone: Negative  / Bili: Negative / Urobili: Negative   Blood: x / Protein: Trace / Nitrite: Negative   Leuk Esterase: Large / RBC: 1 /hpf / WBC 38 /HPF   Sq Epi: x / Non Sq Epi: 4 /hpf / Bacteria: Negative      STUDIES & IMAGING:  Studies (EKG, EEG, EMG, etc):     Radiology (XR, CT, MR, U/S, TTE/CHANDLER):    < from: CT Head No Cont (22 @ 21:31) >  FINDINGS:    Beam hardening artifact slightly obscures evaluation of the posterior   fossa and brainstem.    Redemonstration of a small focus inthe left frontal anterior   parasagittal region (3-24) suspicious for a small area of parenchymal   hemorrhage, unchanged from prior. The adjacent left frontal scalp soft   tissue hematoma is unchanged. There is no acute calvarial fracture.    Reidentified encephalomalacia or chronic infarct in the right posterior   temporal and parietal lobes. Focal areas of right frontal and right   occipital lobe cystic encephalomalacia.    There is cerebral volume loss with prominence of the ventricles and   sulci. There are confluent areas of low attenuation within the   periventricular and subcortical white matter which are nonspecific but   likely the sequela of chronic microvascular change.    The visualized paranasal sinuses and mastoid air cells arewell aerated.   Status post right ocular lens replacement.    IMPRESSION:    Redemonstration of a subcentimeter focus in the left frontal parasagittal   parenchyma, likely representing hemorrhage. No significant interval   change.    Continued left frontal scalp hematoma without underlying calvarium   fracture.    Reidentified encephalomalacia or chronic infarcts in the right cerebral   hemisphere as detailed above.    Consider short term follow-up or further evaluation with MRI provided no   contraindications.    < end of copied text >   HPI:  71M RH w/ HTN, HLD, T2DM, arthritis, CVA (residual L hemiparesis, R MCA) presents for headache. Pt reports a recent admission to [facility] 2 days ago for fall w/ headstrike, found to have an ICH, reportedly a SDH on discharge paperwork from [facility]. Pt reports that he was walking down the stairs and tried to hold onto the railing but fell, unclear if LOC episode prior to fall. Pt describes waking up with a headache this morning , mild, top of head, no clear positional component, no clear aggravating or relieving factors, now resolved in the ED. At baseline, pt ambulates with a cane (had a work accident with a tractor in ). Pt does not know his medications, possibly on aspirin 81mg daily. Pt follows with Dr. Abundio Patterson, outpatient neurologist, possibly for chronic R MCA stroke.     (Stroke only)  NIHSS: 3  preMRS: 2  ICH 0    REVIEW OF SYSTEMS    A 10-system ROS was performed and is negative except for those items noted above and/or in the HPI.    PAST MEDICAL & SURGICAL HISTORY:  DM (diabetes mellitus)  On metformin  Arthritis  COVID-19 vaccine series completed  SDH (subdural hematoma)  HTN (hypertension)  HLD (hyperlipidemia)    No significant past surgical history      FAMILY HISTORY:    SOCIAL HISTORY:   T/E/D:   Occupation:   Lives with:     MEDICATIONS (HOME):  Home Medications:    MEDICATIONS  (STANDING):    MEDICATIONS  (PRN):    ALLERGIES/INTOLERANCES:  Allergies  No Known Allergies    Intolerances    VITALS & EXAMINATION:  Vital Signs Last 24 Hrs  T(C): 36.8 (2022 19:45), Max: 37 (2022 17:45)  T(F): 98.3 (2022 19:45), Max: 98.6 (2022 17:45)  HR: 68 (2022 19:45) (18 - 90)  BP: 148/75 (2022 19:45) (139/70 - 148/75)  BP(mean): --  RR: 16 (2022 19:45) (16 - 18)  SpO2: 99% (2022 19:45) (97% - 100%)    Parameters below as of 2022 19:45  Patient On (Oxygen Delivery Method): room air      General:  Constitutional: Male, appears stated age, in no apparent distress including pain  Head: Normocephalic & atraumatic.  Respiratory: No increased work of breathing  Extremities: No cyanosis, clubbing, or edema.  Skin: No rashes, bruising, or discoloration.      Neurological (>12):  MS: Awake, alert, oriented to person, place, situation, time 22. Normal affect. Follows all commands, including crossed, two step commands but needs repetition and initially incorrect on first try. Bradyphrenia and bradykinesia. Fund of knowledge intact. Registration 3/3, recall 2/3 and 3/3 with prompting. Possible L/R confusion    Language: Speech is clear, fluent with good repetition & comprehension (able to name objects mask, thumb). Bradyphasia    CNs: Pupils (R = 3mm surgical minimally reactive, L = 3mm). LHH. EOMI no nystagmus, no diplopia. V1-3 intact to LT, well developed masseter muscles b/l. No facial asymmetry b/l, full eye closure strength b/l. Hearing grossly normal (rubbing fingers) b/l. Symmetric palate elevation in midline. Gag reflex deferred. Head turning & shoulder shrug intact b/l. Tongue midline, normal movements, no atrophy.    Motor: Normal muscle bulk. No noticeable tremor or seizure.               Deltoid	Biceps	Triceps	Wrist	Finger ABd	   R	5	5	5	5			5 	  L	4+	5	5	4+			5    	H-Flex	K-Flex	K-Ext	D-Flex	P-Flex  R	5	5		5	5	  	   L	5	5		5	5	      Sensation: Decreased to LT on LUE and LLE however possible L/R confusion    Cortical: Extinction on DSS (neglect): none    Reflexes:              Biceps(C5)       BR(C6)     Triceps(C7)               Patellar(L4)    Achilles(S1)    Plantar Resp  R	1	          1	             		        0		    2		Down   L	1	          1             		        0		    2		Down     Coordination: No dysmetria to FTN    Gait: deferred    LABORATORY:  CBC                       12.8   4.84  )-----------( 210      ( 2022 16:07 )             37.3     Chem     138  |  102  |  14  ----------------------------<  233<H>  3.5   |  25  |  0.89    Ca    9.6      2022 16:07    TPro  7.2  /  Alb  4.0  /  TBili  0.6  /  DBili  x   /  AST  15  /  ALT  15  /  AlkPhos  89      LFTs LIVER FUNCTIONS - ( 2022 16:07 )  Alb: 4.0 g/dL / Pro: 7.2 g/dL / ALK PHOS: 89 U/L / ALT: 15 U/L / AST: 15 U/L / GGT: x           Coagulopathy PT/INR - ( 2022 16:07 )   PT: 13.8 sec;   INR: 1.19 ratio         PTT - ( 2022 16:07 )  PTT:28.2 sec  Lipid Panel   A1c   Cardiac enzymes     U/A Urinalysis Basic - ( 2022 21:31 )    Color: Yellow / Appearance: Clear / S.022 / pH: x  Gluc: x / Ketone: Negative  / Bili: Negative / Urobili: Negative   Blood: x / Protein: Trace / Nitrite: Negative   Leuk Esterase: Large / RBC: 1 /hpf / WBC 38 /HPF   Sq Epi: x / Non Sq Epi: 4 /hpf / Bacteria: Negative      STUDIES & IMAGING:  Studies (EKG, EEG, EMG, etc):     Radiology (XR, CT, MR, U/S, TTE/CHANDLER):    < from: CT Head No Cont (22 @ 21:31) >  FINDINGS:    Beam hardening artifact slightly obscures evaluation of the posterior   fossa and brainstem.    Redemonstration of a small focus inthe left frontal anterior   parasagittal region (3-24) suspicious for a small area of parenchymal   hemorrhage, unchanged from prior. The adjacent left frontal scalp soft   tissue hematoma is unchanged. There is no acute calvarial fracture.    Reidentified encephalomalacia or chronic infarct in the right posterior   temporal and parietal lobes. Focal areas of right frontal and right   occipital lobe cystic encephalomalacia.    There is cerebral volume loss with prominence of the ventricles and   sulci. There are confluent areas of low attenuation within the   periventricular and subcortical white matter which are nonspecific but   likely the sequela of chronic microvascular change.    The visualized paranasal sinuses and mastoid air cells arewell aerated.   Status post right ocular lens replacement.    IMPRESSION:    Redemonstration of a subcentimeter focus in the left frontal parasagittal   parenchyma, likely representing hemorrhage. No significant interval   change.    Continued left frontal scalp hematoma without underlying calvarium   fracture.    Reidentified encephalomalacia or chronic infarcts in the right cerebral   hemisphere as detailed above.    Consider short term follow-up or further evaluation with MRI provided no   contraindications.    < end of copied text >

## 2022-07-31 NOTE — ED ADULT TRIAGE NOTE - CHIEF COMPLAINT QUOTE
s/p fall last Wednesday dx: subdural hematoma at [facility]  now c/o headache started today

## 2022-07-31 NOTE — ED PROVIDER NOTE - CLINICAL SUMMARY MEDICAL DECISION MAKING FREE TEXT BOX
72yo M w/ hx htn, dm, arthritis presenting with headache. Pt had SDH at [facility], now saying he has HA. Neuro exam grossly intact. No infectious symptoms. C/f further intracranial injury. will get labs, CT head.

## 2022-07-31 NOTE — ED ADULT NURSE REASSESSMENT NOTE - NS ED NURSE REASSESS COMMENT FT1
Patient resting comfortably, breathing unlabored, VSS, no signs of acute distress, no requests at this time, plan of care explained, side rails raised, call bell within reach, comfort and safety maintained. UA and UC obtained, pt to CT.

## 2022-08-01 DIAGNOSIS — S06.369A TRAUMATIC HEMORRHAGE OF CEREBRUM, UNSPECIFIED, WITH LOSS OF CONSCIOUSNESS OF UNSPECIFIED DURATION, INITIAL ENCOUNTER: ICD-10-CM

## 2022-08-01 DIAGNOSIS — R26.81 UNSTEADINESS ON FEET: ICD-10-CM

## 2022-08-01 DIAGNOSIS — N39.0 URINARY TRACT INFECTION, SITE NOT SPECIFIED: ICD-10-CM

## 2022-08-01 DIAGNOSIS — R55 SYNCOPE AND COLLAPSE: ICD-10-CM

## 2022-08-01 DIAGNOSIS — E11.9 TYPE 2 DIABETES MELLITUS WITHOUT COMPLICATIONS: ICD-10-CM

## 2022-08-01 DIAGNOSIS — Z79.899 OTHER LONG TERM (CURRENT) DRUG THERAPY: ICD-10-CM

## 2022-08-01 LAB
GLUCOSE BLDC GLUCOMTR-MCNC: 167 MG/DL — HIGH (ref 70–99)
GLUCOSE BLDC GLUCOMTR-MCNC: 182 MG/DL — HIGH (ref 70–99)
GLUCOSE BLDC GLUCOMTR-MCNC: 219 MG/DL — HIGH (ref 70–99)
GLUCOSE BLDC GLUCOMTR-MCNC: 261 MG/DL — HIGH (ref 70–99)
PLATELET RESPONSE ASPIRIN RESULT: 620 ARU — SIGNIFICANT CHANGE UP (ref 350–700)

## 2022-08-01 PROCEDURE — 70549 MR ANGIOGRAPH NECK W/O&W/DYE: CPT | Mod: 26

## 2022-08-01 PROCEDURE — 12345: CPT | Mod: NC

## 2022-08-01 PROCEDURE — 99223 1ST HOSP IP/OBS HIGH 75: CPT

## 2022-08-01 RX ORDER — DOXAZOSIN MESYLATE 4 MG
4 TABLET ORAL AT BEDTIME
Refills: 0 | Status: DISCONTINUED | OUTPATIENT
Start: 2022-08-01 | End: 2022-08-03

## 2022-08-01 RX ORDER — CEFTRIAXONE 500 MG/1
1000 INJECTION, POWDER, FOR SOLUTION INTRAMUSCULAR; INTRAVENOUS EVERY 24 HOURS
Refills: 0 | Status: DISCONTINUED | OUTPATIENT
Start: 2022-08-01 | End: 2022-08-03

## 2022-08-01 RX ORDER — HYDRALAZINE HCL 50 MG
10 TABLET ORAL EVERY 6 HOURS
Refills: 0 | Status: DISCONTINUED | OUTPATIENT
Start: 2022-08-01 | End: 2022-08-01

## 2022-08-01 RX ORDER — DEXTROSE 50 % IN WATER 50 %
12.5 SYRINGE (ML) INTRAVENOUS ONCE
Refills: 0 | Status: DISCONTINUED | OUTPATIENT
Start: 2022-08-01 | End: 2022-08-03

## 2022-08-01 RX ORDER — DEXTROSE 50 % IN WATER 50 %
25 SYRINGE (ML) INTRAVENOUS ONCE
Refills: 0 | Status: DISCONTINUED | OUTPATIENT
Start: 2022-08-01 | End: 2022-08-03

## 2022-08-01 RX ORDER — INSULIN LISPRO 100/ML
VIAL (ML) SUBCUTANEOUS AT BEDTIME
Refills: 0 | Status: DISCONTINUED | OUTPATIENT
Start: 2022-08-01 | End: 2022-08-03

## 2022-08-01 RX ORDER — GLUCAGON INJECTION, SOLUTION 0.5 MG/.1ML
1 INJECTION, SOLUTION SUBCUTANEOUS ONCE
Refills: 0 | Status: DISCONTINUED | OUTPATIENT
Start: 2022-08-01 | End: 2022-08-03

## 2022-08-01 RX ORDER — SODIUM CHLORIDE 9 MG/ML
1000 INJECTION, SOLUTION INTRAVENOUS
Refills: 0 | Status: DISCONTINUED | OUTPATIENT
Start: 2022-08-01 | End: 2022-08-03

## 2022-08-01 RX ORDER — LEVETIRACETAM 250 MG/1
500 TABLET, FILM COATED ORAL
Refills: 0 | Status: DISCONTINUED | OUTPATIENT
Start: 2022-08-01 | End: 2022-08-03

## 2022-08-01 RX ORDER — ATORVASTATIN CALCIUM 80 MG/1
40 TABLET, FILM COATED ORAL AT BEDTIME
Refills: 0 | Status: DISCONTINUED | OUTPATIENT
Start: 2022-08-01 | End: 2022-08-03

## 2022-08-01 RX ORDER — INSULIN LISPRO 100/ML
VIAL (ML) SUBCUTANEOUS
Refills: 0 | Status: DISCONTINUED | OUTPATIENT
Start: 2022-08-01 | End: 2022-08-03

## 2022-08-01 RX ORDER — LOSARTAN POTASSIUM 100 MG/1
50 TABLET, FILM COATED ORAL DAILY
Refills: 0 | Status: DISCONTINUED | OUTPATIENT
Start: 2022-08-01 | End: 2022-08-03

## 2022-08-01 RX ORDER — DEXTROSE 50 % IN WATER 50 %
15 SYRINGE (ML) INTRAVENOUS ONCE
Refills: 0 | Status: DISCONTINUED | OUTPATIENT
Start: 2022-08-01 | End: 2022-08-03

## 2022-08-01 RX ORDER — INSULIN GLARGINE 100 [IU]/ML
5 INJECTION, SOLUTION SUBCUTANEOUS EVERY MORNING
Refills: 0 | Status: DISCONTINUED | OUTPATIENT
Start: 2022-08-01 | End: 2022-08-03

## 2022-08-01 RX ORDER — AMLODIPINE BESYLATE 2.5 MG/1
5 TABLET ORAL DAILY
Refills: 0 | Status: DISCONTINUED | OUTPATIENT
Start: 2022-08-01 | End: 2022-08-03

## 2022-08-01 RX ADMIN — LOSARTAN POTASSIUM 50 MILLIGRAM(S): 100 TABLET, FILM COATED ORAL at 17:03

## 2022-08-01 RX ADMIN — CEFTRIAXONE 100 MILLIGRAM(S): 500 INJECTION, POWDER, FOR SOLUTION INTRAMUSCULAR; INTRAVENOUS at 08:42

## 2022-08-01 RX ADMIN — INSULIN GLARGINE 5 UNIT(S): 100 INJECTION, SOLUTION SUBCUTANEOUS at 08:30

## 2022-08-01 RX ADMIN — Medication 1: at 17:44

## 2022-08-01 RX ADMIN — Medication 1: at 08:14

## 2022-08-01 RX ADMIN — Medication 4 MILLIGRAM(S): at 21:08

## 2022-08-01 RX ADMIN — AMLODIPINE BESYLATE 5 MILLIGRAM(S): 2.5 TABLET ORAL at 05:53

## 2022-08-01 RX ADMIN — LEVETIRACETAM 500 MILLIGRAM(S): 250 TABLET, FILM COATED ORAL at 17:03

## 2022-08-01 RX ADMIN — Medication 3: at 13:11

## 2022-08-01 RX ADMIN — LEVETIRACETAM 500 MILLIGRAM(S): 250 TABLET, FILM COATED ORAL at 05:54

## 2022-08-01 RX ADMIN — Medication 10 MILLIGRAM(S): at 05:59

## 2022-08-01 RX ADMIN — ATORVASTATIN CALCIUM 40 MILLIGRAM(S): 80 TABLET, FILM COATED ORAL at 21:08
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
## 2022-08-01 NOTE — H&P ADULT - PROBLEM SELECTOR PLAN 1
- most likely 2/2 fall on 7/28  - seen by neuro and NSG  - plan for MRI/A head  - dysphagia diet  - s/s eval  - neuro check q4h  - keppra 500 BID for 5 days  - goal SBP <160

## 2022-08-01 NOTE — ED ADULT NURSE REASSESSMENT NOTE - NS ED NURSE REASSESS COMMENT FT1
Report given to holding. Pt ate well, tolerated well. Pt given to holding in stable condition, DENIS Tamayo

## 2022-08-01 NOTE — H&P ADULT - NSICDXPASTMEDICALHX_GEN_ALL_CORE_FT
Anesthesia Post-op Note      Patient: David Andrews  ANESTHESIA:  Monitor Anesthesia Care   ANESTHESIOLOGIST:  Anesthesiologist: Mikey Lloyd MD  Procedure(s) Performed: EGD, WITH RADIOFREQUENCY ABLATION OF TRACEY'S ESOPHAGUS    Vital Last Value   Temperature 36.4 °C (97.6 °F) (06/22/22 1046)   Pulse (!) 59 (06/22/22 1046)   Respiratory 20 (06/22/22 1046)   Non-Invasive  Blood Pressure (!) 165/94 (06/22/22 1046)   Arterial   Blood Pressure     Pulse Oximetry 99 % (06/22/22 1046)       Post-op vital signs: stable.  Level of Consciousness: participates in exam, answers questions appropriately, awake and oriented.  Respiratory: unassisted, spontaneous ventilation  Cardiovascular: stable  Hydration: euvolemic  Post-op pain: adequately controlled   Nausea: None  Airway patency: patent  Post-op assessment: Sufficiently recovered from acute administration of anesthesia effects and able to participate in evaluation. and No apparent anesthetic complications.  Complications: None.    Comments:    PAST MEDICAL HISTORY:  Arthritis     COVID-19 vaccine series completed     DM (diabetes mellitus) On metformin    HLD (hyperlipidemia)     HTN (hypertension)     SDH (subdural hematoma)

## 2022-08-01 NOTE — H&P ADULT - NSHPSOCIALHISTORY_GEN_ALL_CORE
Social History:    Marital Status: (  ) , (  ) Single, (  ) , ( x ) , (  )   # of Children: 5 biologic children, 7 step children  Lives with: (  ) alone, ( x ) children, (  ) spouse, (  ) parents, (  ) siblings, (  ) friends, (  ) other:   Occupation:     Substance Use/Illicit Drugs: (  ) never used vs other:   Tobacco Usage: (  ) never smoked, ( x ) former smoker, (  ) current smoker and Total Pack-Years: 50 pk yrs  Last Alcohol Usage/Frequency/Amount/Withdrawal/Hx of Abuse:  couple weeks ago  Foreign travel:   Animal exposure:

## 2022-08-01 NOTE — PROGRESS NOTE ADULT - PROBLEM SELECTOR PLAN 1
- most likely 2/2 fall on 7/28  - seen by neuro and NSG  - plan for MRI/A head, if not planned for tonight, then will require repeat CTH  - dysphagia diet  - s/s eval  - keppra 500 BID for 5 days (8/1-8/5)  - goal SBP <160  - currently on amlodipine 5mg, will start losartan 50mg

## 2022-08-01 NOTE — H&P ADULT - NSHPLABSRESULTS_GEN_ALL_CORE
Personally reviewed old records.  Personally reviewed labs.  Personally reviewed imaging.  Personally reviewed EKG. NSR rate 80, . Inc LBBB. Ant fascic block. No Q waves. No ST changes. TWI in 3.                          12.8   4.84  )-----------( 210      ( 2022 16:07 )             37.3           138  |  102  |  14  ----------------------------<  233<H>  3.5   |  25  |  0.89    Ca    9.6      2022 16:07    TPro  7.2  /  Alb  4.0  /  TBili  0.6  /  DBili  x   /  AST  15  /  ALT  15  /  AlkPhos  89              LIVER FUNCTIONS - ( 2022 16:07 )  Alb: 4.0 g/dL / Pro: 7.2 g/dL / ALK PHOS: 89 U/L / ALT: 15 U/L / AST: 15 U/L / GGT: x             PT/INR - ( 2022 16:07 )   PT: 13.8 sec;   INR: 1.19 ratio         PTT - ( 2022 16:07 )  PTT:28.2 sec    Urinalysis Basic - ( 2022 21:31 )    Color: Yellow / Appearance: Clear / S.022 / pH: x  Gluc: x / Ketone: Negative  / Bili: Negative / Urobili: Negative   Blood: x / Protein: Trace / Nitrite: Negative   Leuk Esterase: Large / RBC: 1 /hpf / WBC 38 /HPF   Sq Epi: x / Non Sq Epi: 4 /hpf / Bacteria: Negative

## 2022-08-01 NOTE — ED ADULT NURSE REASSESSMENT NOTE - NS ED NURSE REASSESS COMMENT FT1
Received pt stable, no sings of acute distress noted, vitals WNL. Finger stick done, insuline given, breakfast given.

## 2022-08-01 NOTE — H&P ADULT - PROBLEM SELECTOR PLAN 5
- need to obtain home meds from [facility], family pharmacy in [facility], or pt's daughter when she gets home
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
## 2022-08-01 NOTE — H&P ADULT - NSHPPHYSICALEXAM_GEN_ALL_CORE
PHYSICAL EXAM:   GENERAL: Alert. +mildly confused. No acute distress. Not thin. Not cachectic. Not obese.  HEAD:  left forehead healing laceration. Normocephalic.  EYES: EOMI. PERRLA. Normal conjunctiva/sclera.  ENT: Neck supple. No JVD. Moist oral mucosa. Not edentulous. No thrush.  LYMPH: Normal supraclavicular/cervical lymph nodes.   CARDIAC: Not tachy, Not robin. Regular rhythm. Not irregularly irregular. S1. S2.   LUNG/CHEST: CTAB. BS equal bilaterally. No wheezes. No rales. No rhonchi.  ABDOMEN: Soft. No tenderness. No distension. No fluid wave. Normal bowel sounds.  BACK: No midline/vertebral tenderness. No flank tenderness.  VASCULAR: +2 b/l radial or ulnar pulses. Palpable DP pulses.  EXTREMITIES:  No clubbing. No cyanosis. No edema. Moving all 4.  NEUROLOGY: A&Ox2-3. Non-focal exam. Cranial nerves intact. Normal speech. Sensation intact.  PSYCH: Normal behavior. Flat affect.  SKIN: No jaundice. No erythema. No rash/lesion.  Vascular Access:     ICU Vital Signs Last 24 Hrs  T(C): 37 (01 Aug 2022 02:55), Max: 37 (31 Jul 2022 17:45)  T(F): 98.6 (01 Aug 2022 02:55), Max: 98.6 (31 Jul 2022 17:45)  HR: 66 (01 Aug 2022 02:55) (18 - 90)  BP: 154/78 (01 Aug 2022 02:55) (139/70 - 154/78)  BP(mean): --  ABP: --  ABP(mean): --  RR: 18 (01 Aug 2022 02:55) (16 - 18)  SpO2: 100% (01 Aug 2022 02:55) (97% - 100%)    O2 Parameters below as of 01 Aug 2022 02:55  Patient On (Oxygen Delivery Method): room air            I&O's Summary

## 2022-08-01 NOTE — PROGRESS NOTE ADULT - PROBLEM SELECTOR PLAN 5
Attempted to call step daughter for collateral/meds, no response *2. Per [facility] patient filled on 7/22:  aspirin 81, vitamin D, losartan 50/HCTZ 12.5, Amlodipine 10mg, chlorthalidone 12.5mg, Gabapentin 300mg TID, Doxazosin 4mg, Metformin 1000mg BID, Trulicity weekly, atorvastatin 40mg qhs, plavix 75mg. [facility] not [facility] certain DM meds reliably (trulicity). Unclear if taking remainder of medications.   -will attempt to confirm which meds actively taken with stepdaughter  -holding DAPT (unclear if taking given PLT response vs nonresponder)  -amlodipine 5mg, losartan 50mg, uptitrate as needed  -holding gabapentin given unsteadiness/MS  -DM regimen as bellow

## 2022-08-02 ENCOUNTER — TRANSCRIPTION ENCOUNTER (OUTPATIENT)
Age: 72
End: 2022-08-02

## 2022-08-02 LAB
ANION GAP SERPL CALC-SCNC: 12 MMOL/L — SIGNIFICANT CHANGE UP (ref 5–17)
BUN SERPL-MCNC: 12 MG/DL — SIGNIFICANT CHANGE UP (ref 7–23)
CALCIUM SERPL-MCNC: 9.4 MG/DL — SIGNIFICANT CHANGE UP (ref 8.4–10.5)
CHLORIDE SERPL-SCNC: 101 MMOL/L — SIGNIFICANT CHANGE UP (ref 96–108)
CO2 SERPL-SCNC: 25 MMOL/L — SIGNIFICANT CHANGE UP (ref 22–31)
CREAT SERPL-MCNC: 0.83 MG/DL — SIGNIFICANT CHANGE UP (ref 0.5–1.3)
CULTURE RESULTS: SIGNIFICANT CHANGE UP
EGFR: 94 ML/MIN/1.73M2 — SIGNIFICANT CHANGE UP
GLUCOSE BLDC GLUCOMTR-MCNC: 189 MG/DL — HIGH (ref 70–99)
GLUCOSE BLDC GLUCOMTR-MCNC: 205 MG/DL — HIGH (ref 70–99)
GLUCOSE BLDC GLUCOMTR-MCNC: 240 MG/DL — HIGH (ref 70–99)
GLUCOSE SERPL-MCNC: 214 MG/DL — HIGH (ref 70–99)
POTASSIUM SERPL-MCNC: 3.5 MMOL/L — SIGNIFICANT CHANGE UP (ref 3.5–5.3)
POTASSIUM SERPL-SCNC: 3.5 MMOL/L — SIGNIFICANT CHANGE UP (ref 3.5–5.3)
SODIUM SERPL-SCNC: 138 MMOL/L — SIGNIFICANT CHANGE UP (ref 135–145)
SPECIMEN SOURCE: SIGNIFICANT CHANGE UP

## 2022-08-02 PROCEDURE — 99232 SBSQ HOSP IP/OBS MODERATE 35: CPT

## 2022-08-02 PROCEDURE — 70553 MRI BRAIN STEM W/O & W/DYE: CPT | Mod: 26

## 2022-08-02 PROCEDURE — 70544 MR ANGIOGRAPHY HEAD W/O DYE: CPT | Mod: 26,59

## 2022-08-02 PROCEDURE — 93306 TTE W/DOPPLER COMPLETE: CPT | Mod: 26

## 2022-08-02 RX ORDER — CHLORHEXIDINE GLUCONATE 213 G/1000ML
1 SOLUTION TOPICAL
Refills: 0 | Status: DISCONTINUED | OUTPATIENT
Start: 2022-08-02 | End: 2022-08-03

## 2022-08-02 RX ADMIN — Medication 2: at 12:14

## 2022-08-02 RX ADMIN — LEVETIRACETAM 500 MILLIGRAM(S): 250 TABLET, FILM COATED ORAL at 05:03

## 2022-08-02 RX ADMIN — LOSARTAN POTASSIUM 50 MILLIGRAM(S): 100 TABLET, FILM COATED ORAL at 05:04

## 2022-08-02 RX ADMIN — AMLODIPINE BESYLATE 5 MILLIGRAM(S): 2.5 TABLET ORAL at 05:04

## 2022-08-02 RX ADMIN — ATORVASTATIN CALCIUM 40 MILLIGRAM(S): 80 TABLET, FILM COATED ORAL at 21:06

## 2022-08-02 RX ADMIN — Medication 2: at 17:03

## 2022-08-02 RX ADMIN — CEFTRIAXONE 100 MILLIGRAM(S): 500 INJECTION, POWDER, FOR SOLUTION INTRAMUSCULAR; INTRAVENOUS at 09:59

## 2022-08-02 RX ADMIN — Medication 4 MILLIGRAM(S): at 21:06

## 2022-08-02 RX ADMIN — LEVETIRACETAM 500 MILLIGRAM(S): 250 TABLET, FILM COATED ORAL at 17:03

## 2022-08-02 NOTE — PHYSICAL THERAPY INITIAL EVALUATION ADULT - STRENGTHENING, PT EVAL
GOAL: Patient will demonstrate an increase in strength where deficient by at least 1 grade to facilitate greater independence during functional mobility and ADL's.

## 2022-08-02 NOTE — DISCHARGE NOTE NURSING/CASE MANAGEMENT/SOCIAL WORK - PATIENT PORTAL LINK FT
You can access the FollowMyHealth Patient Portal offered by [facility] by registering at the following website: [email or web address]. By joining [facility] FollowMyHealth portal, you will also be able to view your health information using other applications (apps) compatible with our system.

## 2022-08-02 NOTE — PROGRESS NOTE ADULT - PROBLEM SELECTOR PLAN 3
- tele  - TTE  - trend CE  - holding gabapentin
- tele  - TTE hyperdynamic LV otherwise unremarkable  - holding gabapentin

## 2022-08-02 NOTE — PHYSICAL THERAPY INITIAL EVALUATION ADULT - IMPAIRMENTS CONTRIBUTING TO GAIT DEVIATIONS, PT EVAL
impaired balance/impaired motor control/narrow base of support/impaired postural control/decreased strength

## 2022-08-02 NOTE — PROGRESS NOTE ADULT - PROBLEM SELECTOR PLAN 2
Seems to have had recent UTI treatment. Reports feeling improved today although unreliable historian. UA unrevealing although possibly affected by recent Abx  -Currently on CTX, if remains asymptomatic and culture no growth by tonight, will d/c
Seems to have had recent UTI treatment. Reports feeling improved today although unreliable historian. UA unrevealing although possibly affected by recent Abx. Ucx likely contaminant  -Currently on CTX, can continue for one more day, if d/chiquis, will discontinue on d/c

## 2022-08-02 NOTE — PHYSICAL THERAPY INITIAL EVALUATION ADULT - ADDITIONAL COMMENTS
Patient in 2 story house with 2 step to enter home, and 8 steps to reach bedroom located on the 1st floor

## 2022-08-02 NOTE — SWALLOW BEDSIDE ASSESSMENT ADULT - COMMENTS
Hx cont: Neuro and Nsx consulted. Impression: fall with headstrike, found to have L frontal parasaggital IPH, possibly traumatic, r/o vascular lesion vs. CAA. Chronic L hemiparesis and LHH likely 2/2 chronic R MCA stroke. Rx: cardiac workup, MRI brain w/w/o contrast, MRA head and neck. No Nsx intervention. Admit to medicine for syncopal w/u, q4h neurochecks. Keppra 500BID x5d.    CT HEAD: 7/31/22  IMPRESSION:  Redemonstration of a subcentimeter focus in the left frontal parasagittal parenchyma, likely representing hemorrhage. No significant interval change. Continued left frontal scalp hematoma without underlying calvarium fracture. Reidentified encephalomalacia or chronic infarcts in the right cerebral hemisphere as detailed above.    CXR: 7/31/22  IMPRESSION:  Clear lungs.    Pt is unknown to this service.

## 2022-08-02 NOTE — PHYSICAL THERAPY INITIAL EVALUATION ADULT - PATIENT/FAMILY/SIGNIFICANT OTHER GOALS STATEMENT, PT EVAL
Spoke with patient.    Conveyed PCP's message below.     Patient verbalized understanding.     Discussed strict hand washing.     Patient would like to know if she should be tested for C-diff.   Independent in ADLs at home and prevent future falls

## 2022-08-02 NOTE — PHYSICAL THERAPY INITIAL EVALUATION ADULT - IMPAIRMENTS FOUND, PT EVAL
poor spatial awareness, need verbal cues to negotiate obstacles during ambulation/gait, locomotion, and balance/muscle strength/poor safety awareness

## 2022-08-02 NOTE — SWALLOW BEDSIDE ASSESSMENT ADULT - SLP PERTINENT HISTORY OF CURRENT PROBLEM
70 y/o RH M with PMH of HTN, HLD, T2DM, arthritis, CVA (R MCA, residual L hemiparesis) p/w headache. Pt reports a recent admission to [facility] 2 days ago for fall with headstrike, found to have an ICH, reportedly a SDH on discharge paperwork from [facility]. At baseline, pt ambulates with a cane.  In ED, CT shows small 3mm Left frontal parasagittal IPH and chronic R MCA stroke. Neuro exam notable for w/ LHH, 4+/5 L hemiparesis, bradyphrenia, bradykinesia, ?L/R confusion.

## 2022-08-02 NOTE — PROGRESS NOTE ADULT - PROBLEM SELECTOR PLAN 5
Attempted to call step daughter for collateral/meds, no response *2. Per [facility] patient filled on 7/22:  aspirin 81, vitamin D, losartan 50/HCTZ 12.5, Amlodipine 10mg, chlorthalidone 12.5mg, Gabapentin 300mg TID, Doxazosin 4mg, Metformin 1000mg BID, Trulicity weekly, atorvastatin 40mg qhs, plavix 75mg. [facility] not taking certain DM meds reliably (trulicity). Unclear if taking remainder of medications.   -discussed with daughter, takes medications, her son helps him  -holding DAPT pending MRI  -amlodipine 5mg, losartan 50mg, uptitrate as needed  -holding gabapentin given unsteadiness/MS  -DM regimen as below  -Holding HCTZ/Chlorthalidone given hyperdynamic LV, may be relatively volume down Attempted to call step daughter for collateral/meds, no response *2. Per [facility] patient filled on 7/22:  aspirin 81, vitamin D, losartan 50/HCTZ 12.5, Amlodipine 10mg, chlorthalidone 12.5mg, Gabapentin 300mg TID, Doxazosin 4mg, Metformin 1000mg BID, Trulicity weekly, atorvastatin 40mg qhs, plavix 75mg. [facility] not taking certain DM meds reliably (trulicity). Unclear if taking remainder of medications.   -discussed with daughter, takes medications, her son helps him  -holding DAPT pending MRI -> restart Aspirin 5-7 days  -amlodipine 5mg, losartan 50mg, uptitrate as needed  -holding gabapentin given unsteadiness/MS  -DM regimen as below  -Holding HCTZ/Chlorthalidone given hyperdynamic LV, may be relatively volume down

## 2022-08-02 NOTE — PROGRESS NOTE ADULT - PROBLEM SELECTOR PLAN 1
- most likely 2/2 fall on 7/28  - seen by neuro and NSG  - keppra 500 BID for 5 days (8/1-8/5)  - goal SBP <160  - currently on amlodipine 5mg, losartan 50mg tolerating  - MRI/A performed pending read, if unrevealing, can d/c home - most likely 2/2 fall on 7/28  - seen by neuro and NSG  - keppra 500 BID for 5 days (8/1-8/5)  - goal SBP <160  - currently on amlodipine 5mg, losartan 50mg tolerating  - MRI/A performed, hemorrhage stable and small  -restart aspirin in 5-7 days

## 2022-08-02 NOTE — PHYSICAL THERAPY INITIAL EVALUATION ADULT - PLANNED THERAPY INTERVENTIONS, PT EVAL
GOAL: Pt will negotiate up/down 8 steps with RT handrail ascending using  independently in 2 weeks/balance training/bed mobility training/gait training/strengthening/transfer training

## 2022-08-02 NOTE — DISCHARGE NOTE NURSING/CASE MANAGEMENT/SOCIAL WORK - NSDCPEFALRISK_GEN_ALL_CORE
For information on Fall & Injury Prevention, visit: [email or web address] OR  [email or web address] OR  https://www.cdc.gov/steadi/patient.html

## 2022-08-02 NOTE — SWALLOW BEDSIDE ASSESSMENT ADULT - SWALLOW EVAL: DIAGNOSIS
Order received and appreciated. Chart reviewed. Pt passed dysphagia screener and is currently on diet. CXR clear. Per discussion with NP, Lita, defer evaluation at this time. Will re-consult if any concerns for dysphagia arise.

## 2022-08-02 NOTE — PROGRESS NOTE ADULT - PROBLEM SELECTOR PLAN 6
-lantus 5U + ISS for now  -holding home metformin and trulicity    Discussed plan with daughter -lantus 5U + ISS for now  -holding home metformin and trulicity    Discussed plan with daughter  d/c home tomorrow

## 2022-08-02 NOTE — PHYSICAL THERAPY INITIAL EVALUATION ADULT - PERTINENT HX OF CURRENT PROBLEM, REHAB EVAL
71M PMH; HTN, DM2, Arthritis, SDH, HLD. . HPI: s/p recent adm. to [facility] (7/28-7/29) post syncope and fall , dx with left hemiparesis & d/c'd, now adm with c/o diziness and unsteady gait. Hosp course: 7/31 CT head: + ICH ( hematoma in left frontal scalp & left frontal parasagittal parenchyma),+ chronic infarcts. CXR clear; repeat CTH without change;  Patient is currently being treat for acute UTI and awaiting MRI brain w/w/o contrast, MRA head & neck;

## 2022-08-03 ENCOUNTER — TRANSCRIPTION ENCOUNTER (OUTPATIENT)
Age: 72
End: 2022-08-03

## 2022-08-03 ENCOUNTER — FORM ENCOUNTER (OUTPATIENT)
Age: 72
End: 2022-08-03

## 2022-08-03 VITALS — SYSTOLIC BLOOD PRESSURE: 144 MMHG | DIASTOLIC BLOOD PRESSURE: 73 MMHG | HEART RATE: 68 BPM

## 2022-08-03 LAB
ANION GAP SERPL CALC-SCNC: 13 MMOL/L — SIGNIFICANT CHANGE UP (ref 5–17)
BASOPHILS # BLD AUTO: 0.03 K/UL — SIGNIFICANT CHANGE UP (ref 0–0.2)
BASOPHILS NFR BLD AUTO: 0.8 % — SIGNIFICANT CHANGE UP (ref 0–2)
BUN SERPL-MCNC: 10 MG/DL — SIGNIFICANT CHANGE UP (ref 7–23)
CALCIUM SERPL-MCNC: 9.3 MG/DL — SIGNIFICANT CHANGE UP (ref 8.4–10.5)
CHLORIDE SERPL-SCNC: 101 MMOL/L — SIGNIFICANT CHANGE UP (ref 96–108)
CO2 SERPL-SCNC: 23 MMOL/L — SIGNIFICANT CHANGE UP (ref 22–31)
CREAT SERPL-MCNC: 0.79 MG/DL — SIGNIFICANT CHANGE UP (ref 0.5–1.3)
EGFR: 95 ML/MIN/1.73M2 — SIGNIFICANT CHANGE UP
EOSINOPHIL # BLD AUTO: 0.16 K/UL — SIGNIFICANT CHANGE UP (ref 0–0.5)
EOSINOPHIL NFR BLD AUTO: 4.3 % — SIGNIFICANT CHANGE UP (ref 0–6)
GLUCOSE BLDC GLUCOMTR-MCNC: 217 MG/DL — HIGH (ref 70–99)
GLUCOSE BLDC GLUCOMTR-MCNC: 232 MG/DL — HIGH (ref 70–99)
GLUCOSE SERPL-MCNC: 189 MG/DL — HIGH (ref 70–99)
HCT VFR BLD CALC: 37.4 % — LOW (ref 39–50)
HGB BLD-MCNC: 13.1 G/DL — SIGNIFICANT CHANGE UP (ref 13–17)
IMM GRANULOCYTES NFR BLD AUTO: 0.3 % — SIGNIFICANT CHANGE UP (ref 0–1.5)
LYMPHOCYTES # BLD AUTO: 1.61 K/UL — SIGNIFICANT CHANGE UP (ref 1–3.3)
LYMPHOCYTES # BLD AUTO: 43.2 % — SIGNIFICANT CHANGE UP (ref 13–44)
MCHC RBC-ENTMCNC: 27.9 PG — SIGNIFICANT CHANGE UP (ref 27–34)
MCHC RBC-ENTMCNC: 35 GM/DL — SIGNIFICANT CHANGE UP (ref 32–36)
MCV RBC AUTO: 79.6 FL — LOW (ref 80–100)
MONOCYTES # BLD AUTO: 0.45 K/UL — SIGNIFICANT CHANGE UP (ref 0–0.9)
MONOCYTES NFR BLD AUTO: 12.1 % — SIGNIFICANT CHANGE UP (ref 2–14)
NEUTROPHILS # BLD AUTO: 1.47 K/UL — LOW (ref 1.8–7.4)
NEUTROPHILS NFR BLD AUTO: 39.3 % — LOW (ref 43–77)
NRBC # BLD: 0 /100 WBCS — SIGNIFICANT CHANGE UP (ref 0–0)
PLATELET # BLD AUTO: 193 K/UL — SIGNIFICANT CHANGE UP (ref 150–400)
POTASSIUM SERPL-MCNC: 3.8 MMOL/L — SIGNIFICANT CHANGE UP (ref 3.5–5.3)
POTASSIUM SERPL-SCNC: 3.8 MMOL/L — SIGNIFICANT CHANGE UP (ref 3.5–5.3)
RBC # BLD: 4.7 M/UL — SIGNIFICANT CHANGE UP (ref 4.2–5.8)
RBC # FLD: 13.9 % — SIGNIFICANT CHANGE UP (ref 10.3–14.5)
SODIUM SERPL-SCNC: 137 MMOL/L — SIGNIFICANT CHANGE UP (ref 135–145)
WBC # BLD: 3.73 K/UL — LOW (ref 3.8–10.5)
WBC # FLD AUTO: 3.73 K/UL — LOW (ref 3.8–10.5)

## 2022-08-03 PROCEDURE — 80048 BASIC METABOLIC PNL TOTAL CA: CPT

## 2022-08-03 PROCEDURE — 85576 BLOOD PLATELET AGGREGATION: CPT

## 2022-08-03 PROCEDURE — 80053 COMPREHEN METABOLIC PANEL: CPT

## 2022-08-03 PROCEDURE — A9585: CPT

## 2022-08-03 PROCEDURE — 70553 MRI BRAIN STEM W/O & W/DYE: CPT

## 2022-08-03 PROCEDURE — 97161 PT EVAL LOW COMPLEX 20 MIN: CPT

## 2022-08-03 PROCEDURE — 85730 THROMBOPLASTIN TIME PARTIAL: CPT

## 2022-08-03 PROCEDURE — 99285 EMERGENCY DEPT VISIT HI MDM: CPT

## 2022-08-03 PROCEDURE — 85025 COMPLETE CBC W/AUTO DIFF WBC: CPT

## 2022-08-03 PROCEDURE — 70549 MR ANGIOGRAPH NECK W/O&W/DYE: CPT

## 2022-08-03 PROCEDURE — 82962 GLUCOSE BLOOD TEST: CPT

## 2022-08-03 PROCEDURE — 93306 TTE W/DOPPLER COMPLETE: CPT

## 2022-08-03 PROCEDURE — 86900 BLOOD TYPING SEROLOGIC ABO: CPT

## 2022-08-03 PROCEDURE — U0003: CPT

## 2022-08-03 PROCEDURE — 70450 CT HEAD/BRAIN W/O DYE: CPT | Mod: MA

## 2022-08-03 PROCEDURE — 70544 MR ANGIOGRAPHY HEAD W/O DYE: CPT

## 2022-08-03 PROCEDURE — 86901 BLOOD TYPING SEROLOGIC RH(D): CPT

## 2022-08-03 PROCEDURE — 71046 X-RAY EXAM CHEST 2 VIEWS: CPT

## 2022-08-03 PROCEDURE — 99239 HOSP IP/OBS DSCHRG MGMT >30: CPT

## 2022-08-03 PROCEDURE — 96374 THER/PROPH/DIAG INJ IV PUSH: CPT

## 2022-08-03 PROCEDURE — U0005: CPT

## 2022-08-03 PROCEDURE — 81001 URINALYSIS AUTO W/SCOPE: CPT

## 2022-08-03 PROCEDURE — 85610 PROTHROMBIN TIME: CPT

## 2022-08-03 PROCEDURE — 86850 RBC ANTIBODY SCREEN: CPT

## 2022-08-03 PROCEDURE — 87086 URINE CULTURE/COLONY COUNT: CPT

## 2022-08-03 RX ORDER — AMLODIPINE BESYLATE 2.5 MG/1
1 TABLET ORAL
Qty: 0 | Refills: 0 | DISCHARGE
Start: 2022-08-03

## 2022-08-03 RX ORDER — LEVETIRACETAM 250 MG/1
1 TABLET, FILM COATED ORAL
Qty: 6 | Refills: 0
Start: 2022-08-03 | End: 2022-08-05

## 2022-08-03 RX ORDER — LOSARTAN POTASSIUM 100 MG/1
1 TABLET, FILM COATED ORAL
Qty: 30 | Refills: 0
Start: 2022-08-03 | End: 2022-09-01

## 2022-08-03 RX ORDER — LEVETIRACETAM 250 MG/1
1 TABLET, FILM COATED ORAL
Qty: 0 | Refills: 0 | DISCHARGE
Start: 2022-08-03

## 2022-08-03 RX ORDER — DOXAZOSIN MESYLATE 4 MG
1 TABLET ORAL
Qty: 0 | Refills: 0 | DISCHARGE
Start: 2022-08-03

## 2022-08-03 RX ORDER — INSULIN LISPRO 100/ML
0 VIAL (ML) SUBCUTANEOUS
Qty: 0 | Refills: 0 | DISCHARGE
Start: 2022-08-03

## 2022-08-03 RX ORDER — LOSARTAN POTASSIUM 100 MG/1
1 TABLET, FILM COATED ORAL
Qty: 0 | Refills: 0 | DISCHARGE
Start: 2022-08-03

## 2022-08-03 RX ORDER — INSULIN GLARGINE 100 [IU]/ML
5 INJECTION, SOLUTION SUBCUTANEOUS
Qty: 0 | Refills: 0 | DISCHARGE
Start: 2022-08-03

## 2022-08-03 RX ORDER — ATORVASTATIN CALCIUM 80 MG/1
1 TABLET, FILM COATED ORAL
Qty: 0 | Refills: 0 | DISCHARGE
Start: 2022-08-03

## 2022-08-03 RX ADMIN — AMLODIPINE BESYLATE 5 MILLIGRAM(S): 2.5 TABLET ORAL at 05:08

## 2022-08-03 RX ADMIN — LOSARTAN POTASSIUM 50 MILLIGRAM(S): 100 TABLET, FILM COATED ORAL at 05:08

## 2022-08-03 RX ADMIN — LEVETIRACETAM 500 MILLIGRAM(S): 250 TABLET, FILM COATED ORAL at 05:08

## 2022-08-03 RX ADMIN — Medication 2: at 08:18

## 2022-08-03 RX ADMIN — CEFTRIAXONE 100 MILLIGRAM(S): 500 INJECTION, POWDER, FOR SOLUTION INTRAMUSCULAR; INTRAVENOUS at 08:20

## 2022-08-03 RX ADMIN — INSULIN GLARGINE 5 UNIT(S): 100 INJECTION, SOLUTION SUBCUTANEOUS at 08:19

## 2022-08-03 RX ADMIN — Medication 1: at 12:00

## 2022-08-03 RX ADMIN — CHLORHEXIDINE GLUCONATE 1 APPLICATION(S): 213 SOLUTION TOPICAL at 05:10

## 2022-08-03 NOTE — PROGRESS NOTE ADULT - SUBJECTIVE AND OBJECTIVE BOX
Neurology Progress Note    S: Patient seen and examined. No new events overnight. patient denied CP, SOB, HA or pain.     Medication:  amLODIPine   Tablet 5 milliGRAM(s) Oral daily  atorvastatin 40 milliGRAM(s) Oral at bedtime  cefTRIAXone   IVPB 1000 milliGRAM(s) IV Intermittent every 24 hours  chlorhexidine 2% Cloths 1 Application(s) Topical <User Schedule>  dextrose 5%. 1000 milliLiter(s) IV Continuous <Continuous>  dextrose 5%. 1000 milliLiter(s) IV Continuous <Continuous>  dextrose 50% Injectable 25 Gram(s) IV Push once  dextrose 50% Injectable 12.5 Gram(s) IV Push once  dextrose 50% Injectable 25 Gram(s) IV Push once  dextrose Oral Gel 15 Gram(s) Oral once PRN  doxazosin 4 milliGRAM(s) Oral at bedtime  glucagon  Injectable 1 milliGRAM(s) IntraMuscular once  insulin glargine Injectable (LANTUS) 5 Unit(s) SubCutaneous every morning  insulin lispro (ADMELOG) corrective regimen sliding scale   SubCutaneous three times a day before meals  insulin lispro (ADMELOG) corrective regimen sliding scale   SubCutaneous at bedtime  levETIRAcetam 500 milliGRAM(s) Oral two times a day  losartan 50 milliGRAM(s) Oral daily      Vitals:  Vital Signs Last 24 Hrs  T(C): 36.8 (03 Aug 2022 10:40), Max: 36.8 (03 Aug 2022 04:22)  T(F): 98.2 (03 Aug 2022 10:40), Max: 98.3 (03 Aug 2022 04:22)  HR: 73 (03 Aug 2022 10:40) (61 - 73)  BP: 169/77 (03 Aug 2022 10:40) (121/71 - 169/77)  BP(mean): --  RR: 18 (03 Aug 2022 10:40) (18 - 18)  SpO2: 99% (03 Aug 2022 10:40) (96% - 99%)    Parameters below as of 03 Aug 2022 10:40  Patient On (Oxygen Delivery Method): room air        General Exam:   General Appearance: Appropriately dressed and in no acute distress       Head: Normocephalic, atraumatic and no dysmorphic features  Ear, Nose, and Throat: Moist mucous membranes  CVS: S1S2+  Resp: No SOB, no wheeze or rhonchi  Abd: soft NTND  Extremities: No edema, no cyanosis  Skin: No bruises, no rashes     Neurological Exam:    MS: Awake, alert, oriented to person, place, situation, time 7/30/22. Normal affect. Follows all commands, including crossed, two step commands but needs repetition and initially incorrect on first try. Bradyphrenia and bradykinesia. Fund of knowledge intact. Registration 3/3, recall 2/3 and 3/3 with prompting. Possible L/R confusion    Language: Speech is clear, fluent with good repetition & comprehension (able to name objects mask, thumb). Bradyphasia    CNs: Pupils (R = 3mm surgical minimally reactive, L = 3mm). LHH. EOMI no nystagmus, no diplopia. V1-3 intact to LT, well developed masseter muscles b/l. No facial asymmetry b/l, full eye closure strength b/l. Hearing grossly normal (rubbing fingers) b/l. Symmetric palate elevation in midline. Gag reflex deferred. Head turning & shoulder shrug intact b/l. Tongue midline, normal movements, no atrophy.    Motor: Normal muscle bulk. No noticeable tremor or seizure.               Deltoid	Biceps	Triceps	Wrist	Finger ABd	   R	5	5	5	5			5 	  L	4+	5	5	4+			5    	H-Flex	K-Flex	K-Ext	D-Flex	P-Flex  R	5	5		5	5	  	   L	5	5		5	5	      Sensation: Decreased to LT on LUE and LLE however possible L/R confusion    Cortical: Extinction on DSS (neglect): none    Reflexes:              Biceps(C5)       BR(C6)     Triceps(C7)               Patellar(L4)    Achilles(S1)    Plantar Resp  R	1	          1	             		        0		    2		Down   L	1	          1             		        0		    2		Down     Coordination: No dysmetria to FTN    Gait: deferred      I personally reviewed the below data/images/labs:      CBC Full  -  ( 03 Aug 2022 07:11 )  WBC Count : 3.73 K/uL  RBC Count : 4.70 M/uL  Hemoglobin : 13.1 g/dL  Hematocrit : 37.4 %  Platelet Count - Automated : 193 K/uL  Mean Cell Volume : 79.6 fl  Mean Cell Hemoglobin : 27.9 pg  Mean Cell Hemoglobin Concentration : 35.0 gm/dL  Auto Neutrophil # : 1.47 K/uL  Auto Lymphocyte # : 1.61 K/uL  Auto Monocyte # : 0.45 K/uL  Auto Eosinophil # : 0.16 K/uL  Auto Basophil # : 0.03 K/uL  Auto Neutrophil % : 39.3 %  Auto Lymphocyte % : 43.2 %  Auto Monocyte % : 12.1 %  Auto Eosinophil % : 4.3 %  Auto Basophil % : 0.8 %    08-03    137  |  101  |  10  ----------------------------<  189<H>  3.8   |  23  |  0.79    Ca    9.3      03 Aug 2022 07:12      < from: CT Head No Cont (07.31.22 @ 21:31) >    ACC: [id number] EXAM:  CT BRAIN                          PROCEDURE DATE:  07/31/2022          INTERPRETATION:  NONCONTRAST CT OF THE BRAIN    CLINICAL INDICATION:  Intracranial hemorrhage. Repeat scan to assess   stability.    TECHNIQUE: Axial CT images are obtained from the cranial vertex to the   skull base without the administration of IV contrast.    FINDINGS:    Beam hardening artifact slightly obscures evaluation of the posterior   fossa and brainstem.    Redemonstration of a small focus inthe left frontal anterior   parasagittal region (3-24) suspicious for a small area of parenchymal   hemorrhage, unchanged from prior. The adjacent left frontal scalp soft   tissue hematoma is unchanged. There is no acute calvarial fracture.    Reidentified encephalomalacia or chronic infarct in the right posterior   temporal and parietal lobes. Focal areas of right frontal and right   occipital lobe cystic encephalomalacia.    There is cerebral volume loss with prominence of the ventricles and   sulci. There are confluent areas of low attenuation within the   periventricular and subcortical white matter which are nonspecific but   likely the sequela of chronic microvascular change.    The visualized paranasal sinuses and mastoid air cells arewell aerated.   Status post right ocular lens replacement.    IMPRESSION:    Redemonstration of a subcentimeter focus in the left frontal parasagittal   parenchyma, likely representing hemorrhage. No significant interval   change.    Continued left frontal scalp hematoma without underlying calvarium   fracture.    Reidentified encephalomalacia or chronic infarcts in the right cerebral   hemisphere as detailed above.    Consider short term follow-up or further evaluation with MRI provided no   contraindications.    --- End of Report ---           RHONDA JUAREZ MD; Resident Radiologist  This document has been electronically signed.  LORRI CALDERON MD; Attending Radiologist  This document has been electronically signed. Jul 31 2022 10:28PM    < end of copied text >  < from: MR Head w/wo IV Cont (08.02.22 @ 16:24) >    ACC: [id number] EXAM:  MR BRAIN WAW IC                        ACC: [id number] EXAM:  MR ANGIO BRAIN                        ACC: [id number] EXAM:  MR ANGIO NECK WAW IC                          PROCEDURE DATE:  08/01/2022          INTERPRETATION:  CLINICALINDICATION: Old right parietal CVA, question   left frontal hemorrhage    MRI brain with and without contrast:      Magnetic resonance imaging of the brain was carried out with transaxial   SPGR, FLAIR, fast spin echo T2 weighted images, axial susceptibility   weighted series, diffusion weighted series and sagittal T1 weighted   series on a 1.5 Afua magnet. Post contrast axial, coronal and sagittal   T1 weighted images were obtained. 10 cc of Gadavist were intravenously   injected, 0 cc were discarded.    Comparison is made with the prior CT of 7/31/2022.    There is mild atrophy with ventricular and sulcal prominence. There is an   old right temporal parietal occipital infarct with susceptibility   staining. There is a small left frontal parasagittal focus of low signal   intensity on the susceptibility series consistent with an old focus of   microhemorrhage. This is not visualized on the T1 weighted series and   therefore likely represent old hemorrhage rather than recent hemorrhage.   This may represents a cavernoma. There is no edema. No acute infarcts are   identified on diffusion-weighted imaging. After contrast administration   there is normal intracranial vascular enhancement. No abnormal   parenchymal or leptomeningeal enhancement is identified.    The sellar and parasellar structures are unremarkable.    MRA head and neck:    Magnetic resonance angiography of the carotid and vertebral circulation   the neck was obtained using 2D time-of-flight technique with an   additional 3D time-of-flight acquisition through the carotid bifurcation.   Dynamic MR angiography of the carotid and vertebral circulation in the   neck was obtained using the neurovascular coil with 10 cc of Gadavist in   a  power injection on a 1.5 Afua magnet. The intracranial circulation   centered the [id number] was evaluated using 3D time-of-flight   technique.    The common, internal and external carotid arteries are unremarkable   bilaterally. The carotid bifurcations are unremarkable. The left   vertebral artery is dominant. The distal vertebral arteries are   hypoplastic.    The distal cervical,, petrous, cavernous and supraclinoid internal   carotid arteries are unremarkable. The anterior cerebral arteries and   intercommunicating artery are visualized, the left A1 segment is   hypoplastic on a congenital basis. The left middle cerebral arteries are   visualized, there is mild narrowing of the inferior left M2 branch. There   is occlusion of the distal right M1 branch of the middle cerebral artery   with a paucity of middle cerebral artery branches in the sylvian fissure.    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   The left posterior cerebral artery appears somewhat narrow at its P1 and   P2 portion. There is also narrowing of the right posterior cerebral   artery with occlusion of the calcarine branch.      IMPRESSION: Old right temporal parietal and occipital infarct with   hemosiderin staining. No abnormal enhancement. Small focus of old   hemorrhage versus cavernoma left frontal parasagittal region. Right M1   occlusion and narrowing of the posterior cerebral arteries bilaterally   with occlusion of the distal right posterior cerebral artery..   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    --- End of Report ---            ADRIANA THOMPSON MD; Attending Radiologist  This document has been electronically signed. Aug  2 2022  4:24PM    < end of copied text >        
[facility] Division of Hospital Medicine  J Luis Harley  Pager (M-F, 8A-5P): [phone]  Other Times:  [phone]    CC: 70 y/o M presenting with dizziness    SUBJECTIVE / OVERNIGHT EVENTS:  No acute events overnight  denies cp/sob  Reports intermittent dizziness  Ambulates with cane but states he doesnt use it often    ADDITIONAL REVIEW OF SYSTEMS:    MEDICATIONS  (STANDING):  amLODIPine   Tablet 5 milliGRAM(s) Oral daily  cefTRIAXone   IVPB 1000 milliGRAM(s) IV Intermittent every 24 hours  dextrose 5%. 1000 milliLiter(s) (100 mL/Hr) IV Continuous <Continuous>  dextrose 5%. 1000 milliLiter(s) (50 mL/Hr) IV Continuous <Continuous>  dextrose 50% Injectable 25 Gram(s) IV Push once  dextrose 50% Injectable 12.5 Gram(s) IV Push once  dextrose 50% Injectable 25 Gram(s) IV Push once  glucagon  Injectable 1 milliGRAM(s) IntraMuscular once  insulin glargine Injectable (LANTUS) 5 Unit(s) SubCutaneous every morning  insulin lispro (ADMELOG) corrective regimen sliding scale   SubCutaneous three times a day before meals  insulin lispro (ADMELOG) corrective regimen sliding scale   SubCutaneous at bedtime  levETIRAcetam 500 milliGRAM(s) Oral two times a day    MEDICATIONS  (PRN):  dextrose Oral Gel 15 Gram(s) Oral once PRN Blood Glucose LESS THAN 70 milliGRAM(s)/deciliter      I&O's Summary      PHYSICAL EXAM:  Vital Signs Last 24 Hrs  T(C): 36.6 (01 Aug 2022 11:18), Max: 37 (2022 17:45)  T(F): 97.8 (01 Aug 2022 11:18), Max: 98.6 (2022 17:45)  HR: 66 (01 Aug 2022 11:18) (18 - 90)  BP: 146/64 (01 Aug 2022 11:18) (123/78 - 165/90)  BP(mean): --  RR: 17 (01 Aug 2022 11:18) (16 - 20)  SpO2: 100% (01 Aug 2022 11:18) (96% - 100%)    Parameters below as of 01 Aug 2022 11:18  Patient On (Oxygen Delivery Method): room air      CONSTITUTIONAL: NAD, well-developed, tired appearing  EYES: PERRLA; conjunctiva and sclera clear  ENMT: Moist oral mucosa, no pharyngeal injection or exudates  NECK: Supple, no palpable masses  RESPIRATORY: Normal respiratory effort; lungs are clear to auscultation bilaterally  CARDIOVASCULAR: Regular rate and rhythm, normal S1 and S2, no murmur/rub/gallop; No lower extremity edema; Peripheral pulses are 2+ bilaterally  ABDOMEN: Nontender to palpation, normoactive bowel sounds  MUSCULOSKELETAL:  no clubbing or cyanosis of digits; no joint swelling or tenderness to palpation  PSYCH: A+O to person, place, and time; affect appropriate. Further mental status exam decreased memory  NEUROLOGY: CN 2-12 are intact and symmetric; no gross sensory deficits. Strength ?5/5 all 4 extrem although appears volitionally limited  SKIN: No rashes; no palpable lesions    LABS:                        12.8   4.84  )-----------( 210      ( 2022 16:07 )             37.3         138  |  102  |  14  ----------------------------<  233<H>  3.5   |  25  |  0.89    Ca    9.6      2022 16:07    TPro  7.2  /  Alb  4.0  /  TBili  0.6  /  DBili  x   /  AST  15  /  ALT  15  /  AlkPhos  89      PT/INR - ( 2022 16:07 )   PT: 13.8 sec;   INR: 1.19 ratio         PTT - ( 2022 16:07 )  PTT:28.2 sec      Urinalysis Basic - ( 2022 21:31 )    Color: Yellow / Appearance: Clear / S.022 / pH: x  Gluc: x / Ketone: Negative  / Bili: Negative / Urobili: Negative   Blood: x / Protein: Trace / Nitrite: Negative   Leuk Esterase: Large / RBC: 1 /hpf / WBC 38 /HPF   Sq Epi: x / Non Sq Epi: 4 /hpf / Bacteria: Negative  
[facility] Division of Hospital Medicine  J Luis Harley  Pager (ARIES, 8A-5P): [phone]  Other Times:  [phone]    CC: 70 y/o M presenting with dizziness    SUBJECTIVE / OVERNIGHT EVENTS:  No acute events overnight  denies headache/dizziness  Denies n/v/ab pain/dysuria    ADDITIONAL REVIEW OF SYSTEMS:    MEDICATIONS  (STANDING):  amLODIPine   Tablet 5 milliGRAM(s) Oral daily  atorvastatin 40 milliGRAM(s) Oral at bedtime  cefTRIAXone   IVPB 1000 milliGRAM(s) IV Intermittent every 24 hours  chlorhexidine 2% Cloths 1 Application(s) Topical <User Schedule>  dextrose 5%. 1000 milliLiter(s) (100 mL/Hr) IV Continuous <Continuous>  dextrose 5%. 1000 milliLiter(s) (50 mL/Hr) IV Continuous <Continuous>  dextrose 50% Injectable 25 Gram(s) IV Push once  dextrose 50% Injectable 12.5 Gram(s) IV Push once  dextrose 50% Injectable 25 Gram(s) IV Push once  doxazosin 4 milliGRAM(s) Oral at bedtime  glucagon  Injectable 1 milliGRAM(s) IntraMuscular once  insulin glargine Injectable (LANTUS) 5 Unit(s) SubCutaneous every morning  insulin lispro (ADMELOG) corrective regimen sliding scale   SubCutaneous three times a day before meals  insulin lispro (ADMELOG) corrective regimen sliding scale   SubCutaneous at bedtime  levETIRAcetam 500 milliGRAM(s) Oral two times a day  losartan 50 milliGRAM(s) Oral daily    MEDICATIONS  (PRN):  dextrose Oral Gel 15 Gram(s) Oral once PRN Blood Glucose LESS THAN 70 milliGRAM(s)/deciliter      I&O's Summary    01 Aug 2022 07:  -  02 Aug 2022 07:00  --------------------------------------------------------  IN: 0 mL / OUT: 125 mL / NET: -125 mL    02 Aug 2022 07:  -  02 Aug 2022 13:46  --------------------------------------------------------  IN: 480 mL / OUT: 100 mL / NET: 380 mL        PHYSICAL EXAM:  Vital Signs Last 24 Hrs  T(C): 36.7 (02 Aug 2022 10:57), Max: 36.9 (01 Aug 2022 18:59)  T(F): 98.1 (02 Aug 2022 10:57), Max: 98.4 (01 Aug 2022 18:59)  HR: 71 (02 Aug 2022 11:48) (62 - 71)  BP: 121/71 (02 Aug 2022 11:48) (109/64 - 163/80)  BP(mean): --  RR: 18 (02 Aug 2022 10:57) (18 - 18)  SpO2: 96% (02 Aug 2022 11:48) (96% - 100%)    Parameters below as of 02 Aug 2022 11:48  Patient On (Oxygen Delivery Method): room air      CONSTITUTIONAL: NAD, well-developed  EYES: PERRLA; conjunctiva and sclera clear  ENMT: Moist oral mucosa, no pharyngeal injection or exudates  NECK: Supple, no palpable masses  RESPIRATORY: Normal respiratory effort; lungs are clear to auscultation bilaterally  CARDIOVASCULAR: Regular rate and rhythm, normal S1 and S2, no murmur/rub/gallop; No lower extremity edema; Peripheral pulses are 2+ bilaterally  ABDOMEN: Nontender to palpation, normoactive bowel sounds  MUSCULOSKELETAL:  no clubbing or cyanosis of digits; no joint swelling or tenderness to palpation  PSYCH: A+O to person, place, and time; affect appropriate. Further mental status exam decreased memory  NEUROLOGY: CN 2-12 are intact and symmetric; no gross sensory deficits. Strength 5/5 all 4 extrem   SKIN: No rashes; no palpable lesions    LABS:                        12.8   4.84  )-----------( 210      ( 2022 16:07 )             37.3     08-02    138  |  101  |  12  ----------------------------<  214<H>  3.5   |  25  |  0.83    Ca    9.4      02 Aug 2022 06:22    TPro  7.2  /  Alb  4.0  /  TBili  0.6  /  DBili  x   /  AST  15  /  ALT  15  /  AlkPhos  89  07-31    PT/INR - ( 2022 16:07 )   PT: 13.8 sec;   INR: 1.19 ratio         PTT - ( 2022 16:07 )  PTT:28.2 sec      Urinalysis Basic - ( 2022 21:31 )    Color: Yellow / Appearance: Clear / S.022 / pH: x  Gluc: x / Ketone: Negative  / Bili: Negative / Urobili: Negative   Blood: x / Protein: Trace / Nitrite: Negative   Leuk Esterase: Large / RBC: 1 /hpf / WBC 38 /HPF   Sq Epi: x / Non Sq Epi: 4 /hpf / Bacteria: Negative        Culture - Urine (collected 2022 21:31)  Source: Clean Catch Clean Catch (Midstream)  Final Report (02 Aug 2022 08:05):    >=3 organisms. Probable collection contamination.    TTE  Mitral Valve: Normal mitral valve.  Aortic Valve/Aorta: Calcified aortic valve with normal  opening.  Normal aortic root size.  Left Atrium: Moderately dilated left atrium.  Left Ventricle: Normal left ventricular internal dimensions  with mild-moderate concentric hypertrophy.  Hyperdynamic left ventricle.  Impaired LV-relaxation with normal filling pressure.  Right Heart: Normal right atrium. Normal right ventricular  size and function.  Normal tricuspid valve. Normal pulmonic valve.  Pericardium/Pleura: Normal pericardium with no pericardial  effusion.  Hemodynamic: Estimated right atrial pressure is normal.  No evidence of pulmonary hypertension.

## 2022-08-03 NOTE — DISCHARGE NOTE PROVIDER - NSDCCPCAREPLAN_GEN_ALL_CORE_FT
PRINCIPAL DISCHARGE DIAGNOSIS  Diagnosis: Syncope  Assessment and Plan of Treatment: HOME CARE INSTRUCTIONS  Have someone stay with you until you feel stable.  Do not drive, operate machinery, or play sports until your caregiver says it is okay.  Keep all follow-up appointments as directed by your caregiver.   Lie down right away if you start feeling like you might faint. Breathe deeply and steadily. Wait until all the symptoms have passed.Drink enough fluids to keep your urine clear or pale yellow.  If you are taking blood pressure or heart medicine, get up slowly, taking several minutes to sit and then stand. This can reduce dizziness.  SEEK IMMEDIATE MEDICAL CARE IF:  You have a severe headache.  You have unusual pain in the chest, abdomen, or back.  You are bleeding from the mouth or rectum, or you have black or tarry stool.  You have an irregular or very fast heartbeat.  You have pain with breathing.  You have repeated fainting or seizure-like jerking during an episode.  You faint when sitting or lying down.  You have confusion.  You have difficulty walking.  You have severe weakness.  You have vision problems.  If you fainted, call your local emergency services (_____________________). Do not drive yourself to the hospital        SECONDARY DISCHARGE DIAGNOSES  Diagnosis: Acute UTI  Assessment and Plan of Treatment: HOME CARE INSTRUCTIONS  completed antibiotcs  Drink enough water and fluids to keep your urine clear or pale yellow.  Avoid caffeine, tea, and carbonated beverages. They tend to irritate your bladder.  Empty your bladder often. Avoid holding urine for long periods of time.  Empty your bladder before and after sexual intercourse.  After a bowel movement, women should cleanse from front to back. Use each tissue only once.  SEEK MEDICAL CARE IF:  You have back pain.  You develop a fever.  Your symptoms do not begin to resolve within 3 days.  SEEK IMMEDIATE MEDICAL CARE IF:  You have severe back pain or lower abdominal pain.  You develop chills.  You have nausea or vomiting.  You have continued burning or discomfort with urination.      Diagnosis: Intraparenchymal hemorrhage of brain  Assessment and Plan of Treatment: Follow up with Neurology in 2 weeks  YOu may restart aspirin on 8/8/22    Diagnosis: DM2 (diabetes mellitus, type 2)  Assessment and Plan of Treatment: HgA1C this admission.  Make sure you get your HgA1c checked every three months.  If you take oral diabetes medications, check your blood glucose two times a day.  If you take insulin, check your blood glucose before meals and at bedtime.  It's important not to skip any meals.  Keep a log of your blood glucose results and always take it with you to your doctor appointments.  Keep a list of your current medications including injectables and over the counter medications and bring this medication list with you to all your doctor appointments.  If you have not seen your ophthalmologist this year call for appointment.  Check your feet daily for redness, sores, or openings. Do not self treat. If no improvement in two days call your primary care physician for an appointment.  Low blood sugar (hypoglycemia) is a blood sugar below 70mg/dl. Check your blood sugar if you feel signs/symptoms of hypoglycemia. If your blood sugar is below 70 take 15 grams of carbohydrates (ex 4 oz of apple juice, 3-4 glucose tablets, or 4-6 oz of regular soda) wait 15 minutes and repeat blood sugar to make sure it comes up above 70.  If your blood sugar is above 70 and you are due for a meal, have a meal.  If you are not due for a meal have a snack.  This snack helps keeps your blood sugar at a safe range.
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
## 2022-08-03 NOTE — PATIENT PROFILE ADULT - TRANSPORTATION
CASSANDRA MCHUGH  [id number]  [address] [place] [address]  Phone: [phone]  Fax: [phone]  Follow Up Time:
no

## 2022-08-03 NOTE — DISCHARGE NOTE PROVIDER - PROVIDER TOKENS
PROVIDER:[TOKEN:[[id number]],FOLLOWUP:[2 weeks]],PROVIDER:[TOKEN:[[id number]],FOLLOWUP:[2 weeks]]

## 2022-08-03 NOTE — DISCHARGE NOTE PROVIDER - CARE PROVIDER_API CALL
Vitor Beltre)  Neurology; Vascular Neurology  [address], [address]  [place] [address]  Phone: [phone]  Fax: [phone]  Follow Up Time: 2 weeks    Rosa Barone (NP; RN)  NP in Adult Health  [address] [place] [address] [address]  [place] [address]  Phone: [phone]  Fax: [phone]  Follow Up Time: 2 weeks

## 2022-08-03 NOTE — DISCHARGE NOTE PROVIDER - NSDCMRMEDTOKEN_GEN_ALL_CORE_FT
amLODIPine 5 mg oral tablet: 1 tab(s) orally once a day  atorvastatin 40 mg oral tablet: 1 tab(s) orally once a day (at bedtime)  doxazosin 4 mg oral tablet: 1 tab(s) orally once a day (at bedtime)  insulin glargine 100 units/mL subcutaneous solution: 5 unit(s) subcutaneous once a day (in the morning)  insulin lispro 100 units/mL injectable solution: injectable 3 times a day (before meals)  1 Unit(s) if Glucose 151 - 200  2 Unit(s) if Glucose 201 - 250  3 Unit(s) if Glucose 251 - 300  4 Unit(s) if Glucose 301 - 350  5 Unit(s) if Glucose 351 - 400  6 Unit(s) if Glucose Greater Than 400  insulin lispro 100 units/mL injectable solution: injectable once a day (at bedtime)  0 Unit(s) if Glucose 0 - 250  1 Unit(s) if Glucose 251 - 300  2 Unit(s) if Glucose 301 - 350  3 Unit(s) if Glucose 351 - 400  4 Unit(s) if Glucose Greater Than 400  levETIRAcetam 500 mg oral tablet: 1 tab(s) orally 2 times a day until 8/5/22  losartan 50 mg oral tablet: 1 tab(s) orally once a day  Rolling walker : once a day    aspirin 81 mg oral tablet: 1 tab(s) orally once a day   ( restart 8/8/22)  atorvastatin 40 mg oral tablet: 1 tab(s) orally once a day (at bedtime)  doxazosin 4 mg oral tablet: 1 tab(s) orally once a day (at bedtime)  levETIRAcetam 500 mg oral tablet: 1 tab(s) orally 2 times a day until 8/5/22  losartan 50 mg oral tablet: 1 tab(s) orally once a day  metFORMIN 1000 mg oral tablet: 1 tab(s) orally 2 times a day  Neurontin 300 mg oral capsule: 1 cap(s) orally 3 times a day  Norvasc 10 mg oral tablet: 1 tab(s) orally once a day  Rolling walker : once a day   Trulicity Pen 0.75 mg/0.5 mL subcutaneous solution: 0.75 milligram(s) subcutaneous every 7 days   aspirin 81 mg oral tablet: 1 tab(s) orally once a day   ( restart 8/8/22)  atorvastatin 40 mg oral tablet: 1 tab(s) orally once a day (at bedtime)  Clearance note: Mr. Baez is medically cleared for necessary opthamology procedure. Please contact us for further questions.  doxazosin 4 mg oral tablet: 1 tab(s) orally once a day (at bedtime)  levETIRAcetam 500 mg oral tablet: 1 tab(s) orally 2 times a day until 8/5/22  losartan 50 mg oral tablet: 1 tab(s) orally once a day  metFORMIN 1000 mg oral tablet: 1 tab(s) orally 2 times a day  Neurontin 300 mg oral capsule: 1 cap(s) orally 3 times a day  Norvasc 10 mg oral tablet: 1 tab(s) orally once a day  Rolling walker : once a day   Trulicity Pen 0.75 mg/0.5 mL subcutaneous solution: 0.75 milligram(s) subcutaneous every 7 days

## 2022-08-03 NOTE — DISCHARGE NOTE PROVIDER - NSDCFUADDAPPT_GEN_ALL_CORE_FT
APPTS ARE READY TO BE MADE: [x] YES    Best Family or Patient Contact (if needed):    Additional Information about above appointments (if needed):    1:   2:   3:     Other comments or requests:    APPTS ARE READY TO BE MADE: [x] YES    Best Family or Patient Contact (if needed):    Additional Information about above appointments (if needed):    1:   2:   3:     Other comments or requests:   										   Patient was provided with follow up request details and was advised to call to schedule follow up within specified time frame.

## 2022-08-03 NOTE — PATIENT PROFILE ADULT - FALL HARM RISK - HARM RISK INTERVENTIONS
Assistance with ambulation/Assistance OOB with selected safe patient handling equipment/Communicate Risk of Fall with Harm to all staff/Discuss with provider need for PT consult/Monitor gait and stability/Provide patient with walking aids - walker, cane, crutches/Reinforce activity limits and safety measures with patient and family/Sit up slowly, dangle for a short time, stand at bedside before walking/Tailored Fall Risk Interventions/Use of alarms - bed, chair and/or voice tab/Visual Cue: Yellow wristband and red socks/Bed in lowest position, wheels locked, appropriate side rails in place/Call bell, personal items and telephone in reach/Instruct patient to call for assistance before getting out of bed or chair/Non-slip footwear when patient is out of bed/Blanket to call system/Physically safe environment - no spills, clutter or unnecessary equipment/Purposeful Proactive Rounding/Room/bathroom lighting operational, light cord in reach
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
## 2022-08-04 ENCOUNTER — APPOINTMENT (OUTPATIENT)
Dept: HOME HEALTH SERVICES | Facility: HOME HEALTH | Age: 72
End: 2022-08-04

## 2022-08-10 ENCOUNTER — APPOINTMENT (OUTPATIENT)
Dept: HOME HEALTH SERVICES | Facility: HOME HEALTH | Age: 72
End: 2022-08-10

## 2022-08-10 VITALS
DIASTOLIC BLOOD PRESSURE: 70 MMHG | RESPIRATION RATE: 16 BRPM | OXYGEN SATURATION: 99 % | SYSTOLIC BLOOD PRESSURE: 110 MMHG | HEART RATE: 78 BPM | TEMPERATURE: 97.8 F

## 2022-08-10 DIAGNOSIS — Z87.81 PERSONAL HISTORY OF (HEALED) TRAUMATIC FRACTURE: ICD-10-CM

## 2022-08-10 PROCEDURE — 99495 TRANSJ CARE MGMT MOD F2F 14D: CPT

## 2022-08-10 RX ORDER — SULFAMETHOXAZOLE AND TRIMETHOPRIM 400; 80 MG/1; MG/1
400-80 TABLET ORAL
Qty: 24 | Refills: 0 | Status: DISCONTINUED | COMMUNITY
Start: 2022-07-29

## 2022-08-16 PROBLEM — Z87.81 HISTORY OF FRACTURE OF CLAVICLE: Status: RESOLVED | Noted: 2022-08-16 | Resolved: 2022-08-16

## 2022-08-16 NOTE — HISTORY OF PRESENT ILLNESS
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 Patient has not been in close contact with someone covid positive. \par N95 mask, gloves, eye wear used during visit: [Y]. Total face to face time with patient is [15] min.\par \par PMH: Diabetes mellitus type 2 with neuropathy,  hypertension, hyperlipidemia, smoker,  BPH, CVA,  ICH\par \par JOSE BRUNNER is being seen after discharge home from [facility] hospital. He  was admitted on 07/31/2022 for traumatic cerebral intraparenchymal hemorrhage after a fall, UTI, syncope and discharged home on 08/3/2022 with home care\par Post-discharge contact was made within 2 days of discharge home.\par Discharge medications were reviewed and reconciled with the current medication list and medications in home.\par \par \par Pt A&Ox3, with no c/o; has confusion - reports he is ok (R) cataract was done last month \par Thought to have seizure in hospital after fall on 7/28- given keppra for 5 days per neuro\par UTI treated with ceftriaxone\par Daughter setting up medications and grandson giving him the pills but he often does not take pills\par Appetite is good\par Denies constipation\par Has no urinary issues\par \par DM:  A1c 7.8 ON 4/8- still not checking blood sugars or taking Trulicity-  on metformin 1000mg BID, glipizide 10 daily,  \par CKD: Creat 1.02 GFR 79 on 4/8\par \par HTN: on  amlodipine, losartan, chlorthalidone  \par \par HLD:  triglycerides, total cholesterol  normal;  LDL high on 4/8 but decreased from previous- on atorvastatin 40\par \par BPH: on doxazosin- denies urinary issues-\par \par

## 2022-08-16 NOTE — REVIEW OF SYSTEMS
[Negative] : Heme/Lymph [FreeTextEntry2] : as noted in HPI [FreeTextEntry9] : as noted in HPI [de-identified] : as noted in HPI

## 2022-08-16 NOTE — COUNSELING
Faxed [facility] at [facility] in [place] pt's chart information through [facility].   Phone [phone].   [Normal Weight - ( BMI  <25 )] : normal weight - ( BMI  <25 ) [DASH diet recommended] : DASH diet recommended [Smoker, not interested in quitting] : smoker, not interested in quitting [] : abdominal aortic ultrasound [Date: ___] : diabetic screening completed on [unfilled] [Improve mobility] : improve mobility [Improve weight] : improve weight [Discussed disease trajectory with patient/caregiver] : discussed disease trajectory with patient/caregiver [Completed Healthcare Proxy] : completed healthcare proxy [Completed DNR] : completed DNR [Completed Medical Orders for Life-Sustaining Treatment] : completed medical orders for life-sustaining treatment [Full Code] : Code Status: Full Code [No Limitations] : Treatment Guidelines: No limitations [Trial of Intubation] : Intubation: Trial of Intubation [_____] : HCP: [unfilled] [[facility]: _____] [facility]/last verification date: [unfilled]

## 2022-08-16 NOTE — DISCUSSION/SUMMARY
[FreeTextEntry1] : patient denies fever, cough, trouble breathing, rash, vomiting and diarrhea. Patient has not been in close contact with someone covid positive.\par Face to Face Visit scheduled.\par \par \par

## 2022-08-16 NOTE — REASON FOR VISIT
[Post Hospitalization] : a post hospitalization visit [Family Member] : family member [Pre-Visit Preparation] : pre-visit preparation was done [Intercurrent Specialty/Sub-specialty Visits] : the patient has no intercurrent specialty/sub-specialty visits [FreeTextEntry1] : for cerebral hemorrhage, UTI, syncope and pre surgical clearance for (L) eye cataract

## 2022-08-16 NOTE — PHYSICAL EXAM
[No Acute Distress] : no acute distress [Well Nourished] : well nourished [Well Developed] : well developed [Normal Sclera/Conjunctiva] : normal sclera/conjunctiva [EOMI] : extra ocular movement intact [Normal Outer Ear/Nose] : the ears and nose were normal in appearance [Normal Oropharynx] : the oropharynx was normal [No JVD] : no jugular venous distention [No Respiratory Distress] : no respiratory distress [Clear to Auscultation] : lungs were clear to auscultation bilaterally [No Accessory Muscle Use] : no accessory muscle use [Normal Rate] : heart rate was normal  [Regular Rhythm] : with a regular rhythm [Normal S1, S2] : normal S1 and S2 [No Murmurs] : no murmurs heard [No Edema] : there was no peripheral edema [Normal Bowel Sounds] : normal bowel sounds [Non Tender] : non-tender [Soft] : abdomen soft [Not Distended] : not distended [No CVA Tenderness] : no ~M costovertebral angle tenderness [No Spinal Tenderness] : no spinal tenderness [No Joint Swelling] : no joint swelling seen [No Clubbing, Cyanosis] : no clubbing  or cyanosis of the fingernails [Normal Strength/Tone] : muscle strength and tone were normal [No Rash] : no rash [No Skin Lesions] : no skin lesions [Oriented x3] : oriented to person, place, and time [Normal Affect] : the affect was normal [de-identified] : ambulating with cane

## 2022-08-30 RX ORDER — DULAGLUTIDE 4.5 MG/.5ML
0.75 INJECTION, SOLUTION SUBCUTANEOUS
Qty: 0 | Refills: 0 | DISCHARGE

## 2022-08-30 RX ORDER — AMLODIPINE BESYLATE 2.5 MG/1
1 TABLET ORAL
Qty: 0 | Refills: 0 | DISCHARGE

## 2022-08-30 RX ORDER — GABAPENTIN 400 MG/1
1 CAPSULE ORAL
Qty: 0 | Refills: 0 | DISCHARGE

## 2022-08-30 RX ORDER — ASPIRIN/CALCIUM CARB/MAGNESIUM 324 MG
1 TABLET ORAL
Qty: 0 | Refills: 0 | DISCHARGE

## 2022-08-30 RX ORDER — METFORMIN HYDROCHLORIDE 850 MG/1
1 TABLET ORAL
Qty: 0 | Refills: 0 | DISCHARGE

## 2022-09-16 ENCOUNTER — NON-APPOINTMENT (OUTPATIENT)
Age: 72
End: 2022-09-16

## 2022-09-16 PROBLEM — I10 ESSENTIAL (PRIMARY) HYPERTENSION: Chronic | Status: ACTIVE | Noted: 2022-07-31

## 2022-09-16 PROBLEM — Z92.29 PERSONAL HISTORY OF OTHER DRUG THERAPY: Chronic | Status: ACTIVE | Noted: 2022-07-31

## 2022-09-16 PROBLEM — S06.5X9A TRAUMATIC SUBDURAL HEMORRHAGE WITH LOSS OF CONSCIOUSNESS OF UNSPECIFIED DURATION, INITIAL ENCOUNTER: Chronic | Status: ACTIVE | Noted: 2022-07-31

## 2022-09-16 PROBLEM — E78.5 HYPERLIPIDEMIA, UNSPECIFIED: Chronic | Status: ACTIVE | Noted: 2022-07-31

## 2022-09-16 PROBLEM — M19.90 UNSPECIFIED OSTEOARTHRITIS, UNSPECIFIED SITE: Chronic | Status: ACTIVE | Noted: 2022-07-31

## 2022-09-16 PROBLEM — E11.9 TYPE 2 DIABETES MELLITUS WITHOUT COMPLICATIONS: Chronic | Status: ACTIVE | Noted: 2022-07-31

## 2022-09-21 ENCOUNTER — RX RENEWAL (OUTPATIENT)
Age: 72
End: 2022-09-21

## 2022-09-23 ENCOUNTER — APPOINTMENT (OUTPATIENT)
Dept: HOME HEALTH SERVICES | Facility: HOME HEALTH | Age: 72
End: 2022-09-23

## 2022-09-23 ENCOUNTER — MED ADMIN CHARGE (OUTPATIENT)
Age: 72
End: 2022-09-23

## 2022-09-23 VITALS
RESPIRATION RATE: 16 BRPM | OXYGEN SATURATION: 92 % | SYSTOLIC BLOOD PRESSURE: 112 MMHG | TEMPERATURE: 97.9 F | DIASTOLIC BLOOD PRESSURE: 74 MMHG | HEART RATE: 72 BPM

## 2022-10-18 ENCOUNTER — RX RENEWAL (OUTPATIENT)
Age: 72
End: 2022-10-18

## 2022-10-19 ENCOUNTER — RX RENEWAL (OUTPATIENT)
Age: 72
End: 2022-10-19

## 2022-10-25 ENCOUNTER — APPOINTMENT (OUTPATIENT)
Dept: HOME HEALTH SERVICES | Facility: HOME HEALTH | Age: 72
End: 2022-10-25

## 2022-10-28 ENCOUNTER — APPOINTMENT (OUTPATIENT)
Dept: HOME HEALTH SERVICES | Facility: HOME HEALTH | Age: 72
End: 2022-10-28

## 2022-10-28 VITALS
RESPIRATION RATE: 16 BRPM | OXYGEN SATURATION: 97 % | TEMPERATURE: 98.1 F | HEART RATE: 78 BPM | SYSTOLIC BLOOD PRESSURE: 140 MMHG | DIASTOLIC BLOOD PRESSURE: 80 MMHG

## 2022-10-28 DIAGNOSIS — H26.9 UNSPECIFIED CATARACT: ICD-10-CM

## 2022-10-28 PROCEDURE — 99349 HOME/RES VST EST MOD MDM 40: CPT | Mod: 25

## 2022-10-28 PROCEDURE — G0008: CPT

## 2022-10-28 PROCEDURE — 90662 IIV NO PRSV INCREASED AG IM: CPT

## 2022-11-01 ENCOUNTER — NON-APPOINTMENT (OUTPATIENT)
Age: 72
End: 2022-11-01

## 2022-11-08 ENCOUNTER — RX RENEWAL (OUTPATIENT)
Age: 72
End: 2022-11-08

## 2022-11-08 DIAGNOSIS — E55.9 VITAMIN D DEFICIENCY, UNSPECIFIED: ICD-10-CM

## 2022-11-08 PROBLEM — H26.9 BILATERAL CATARACTS: Status: ACTIVE | Noted: 2021-05-10

## 2022-11-08 NOTE — COUNSELING
[Normal Weight - ( BMI  <25 )] : normal weight - ( BMI  <25 ) [DASH diet recommended] : DASH diet recommended [Smoker, not interested in quitting] : smoker, not interested in quitting [] : abdominal aortic ultrasound [Date: ___] : diabetic screening completed on [unfilled] [Improve mobility] : improve mobility [Improve weight] : improve weight [Discussed disease trajectory with patient/caregiver] : discussed disease trajectory with patient/caregiver [Completed Healthcare Proxy] : completed healthcare proxy [Completed DNR] : completed DNR [Completed Medical Orders for Life-Sustaining Treatment] : completed medical orders for life-sustaining treatment [Full Code] : Code Status: Full Code [No Limitations] : Treatment Guidelines: No limitations [Trial of Intubation] : Intubation: Trial of Intubation [[facility]: _____[facility]last verification date: [unfilled] [_____] : HCP: [unfilled]

## 2022-11-08 NOTE — PHYSICAL EXAM
[No Acute Distress] : no acute distress [Well Nourished] : well nourished [Well Developed] : well developed [Normal Sclera/Conjunctiva] : normal sclera/conjunctiva [EOMI] : extra ocular movement intact [Normal Outer Ear/Nose] : the ears and nose were normal in appearance [Normal Oropharynx] : the oropharynx was normal [No JVD] : no jugular venous distention [No Respiratory Distress] : no respiratory distress [Clear to Auscultation] : lungs were clear to auscultation bilaterally [No Accessory Muscle Use] : no accessory muscle use [Normal Rate] : heart rate was normal  [Regular Rhythm] : with a regular rhythm [Normal S1, S2] : normal S1 and S2 [No Murmurs] : no murmurs heard [No Edema] : there was no peripheral edema [Normal Bowel Sounds] : normal bowel sounds [Non Tender] : non-tender [Soft] : abdomen soft [Not Distended] : not distended [No CVA Tenderness] : no ~M costovertebral angle tenderness [No Spinal Tenderness] : no spinal tenderness [No Joint Swelling] : no joint swelling seen [No Clubbing, Cyanosis] : no clubbing  or cyanosis of the fingernails [Normal Strength/Tone] : muscle strength and tone were normal [No Rash] : no rash [No Skin Lesions] : no skin lesions [Oriented x3] : oriented to person, place, and time [Normal Affect] : the affect was normal [de-identified] : ambulating with cane

## 2022-11-08 NOTE — HISTORY OF PRESENT ILLNESS
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 Patient has not been in close contact with someone covid positive. \par N95 mask, gloves, eye wear used during visit: [Y]. Total face to face time with patient is [15] min.\par \par PMH: Diabetes mellitus type 2 with neuropathy,  hypertension, hyperlipidemia, smoker,  BPH, CVA,  ICH\par \par Pt A&Ox 2-3, with no c/o; reports (L) cataract never done as clearance was not received although sent to surgical center after 9/16 clearance visit \par Still has confusion after CVA in summer\par Daughter setting up medications and grandson giving him the pills but he still often does not take pills\par Appetite is good\par Denies constipation\par Has no urinary issues\par \par DM:  A1c 7.8 ON 4/8- still not checking blood sugars or taking Trulicity-  on metformin 1000mg BID, glipizide 10 daily,  \par CKD: Creat 1.02 GFR 79 on 4/8\par \par HTN: on  amlodipine, losartan, chlorthalidone  \par \par HLD:  triglycerides, total cholesterol  normal;  LDL high on 4/8 but decreased from previous- on atorvastatin 40\par \par BPH: on doxazosin- denies urinary issues-\par \par

## 2022-11-08 NOTE — REVIEW OF SYSTEMS
[Negative] : Heme/Lymph [FreeTextEntry2] : as noted in HPI [FreeTextEntry9] : as noted in HPI [de-identified] : as noted in HPI

## 2023-01-13 ENCOUNTER — RX RENEWAL (OUTPATIENT)
Age: 73
End: 2023-01-13

## 2023-01-20 ENCOUNTER — NON-APPOINTMENT (OUTPATIENT)
Age: 73
End: 2023-01-20

## 2023-01-24 ENCOUNTER — APPOINTMENT (OUTPATIENT)
Dept: HOME HEALTH SERVICES | Facility: HOME HEALTH | Age: 73
End: 2023-01-24

## 2023-01-31 ENCOUNTER — NON-APPOINTMENT (OUTPATIENT)
Age: 73
End: 2023-01-31

## 2023-02-03 ENCOUNTER — APPOINTMENT (OUTPATIENT)
Dept: HOME HEALTH SERVICES | Facility: HOME HEALTH | Age: 73
End: 2023-02-03
Payer: COMMERCIAL

## 2023-02-03 VITALS
RESPIRATION RATE: 16 BRPM | HEART RATE: 78 BPM | SYSTOLIC BLOOD PRESSURE: 140 MMHG | TEMPERATURE: 98.1 F | OXYGEN SATURATION: 98 % | DIASTOLIC BLOOD PRESSURE: 80 MMHG

## 2023-02-03 DIAGNOSIS — Z92.29 PERSONAL HISTORY OF OTHER DRUG THERAPY: ICD-10-CM

## 2023-02-03 DIAGNOSIS — Z23 ENCOUNTER FOR IMMUNIZATION: ICD-10-CM

## 2023-02-03 DIAGNOSIS — Z87.898 PERSONAL HISTORY OF OTHER SPECIFIED CONDITIONS: ICD-10-CM

## 2023-02-03 DIAGNOSIS — Z63.4 DISAPPEARANCE AND DEATH OF FAMILY MEMBER: ICD-10-CM

## 2023-02-03 DIAGNOSIS — M54.50 LOW BACK PAIN, UNSPECIFIED: ICD-10-CM

## 2023-02-03 DIAGNOSIS — Z86.79 PERSONAL HISTORY OF OTHER DISEASES OF THE CIRCULATORY SYSTEM: ICD-10-CM

## 2023-02-03 PROCEDURE — 99349 HOME/RES VST EST MOD MDM 40: CPT

## 2023-02-03 SDOH — SOCIAL STABILITY - SOCIAL INSECURITY: DISSAPEARANCE AND DEATH OF FAMILY MEMBER: Z63.4

## 2023-02-13 PROBLEM — Z23 ENCOUNTER FOR IMMUNIZATION: Status: RESOLVED | Noted: 2021-03-11 | Resolved: 2023-02-13

## 2023-02-13 PROBLEM — Z63.4 BEREAVEMENT: Status: RESOLVED | Noted: 2021-08-10 | Resolved: 2023-02-13

## 2023-02-13 PROBLEM — Z92.29 HISTORY OF INFLUENZA VACCINATION: Status: RESOLVED | Noted: 2022-09-16 | Resolved: 2023-02-13

## 2023-02-13 PROBLEM — Z86.79 HISTORY OF CEREBRAL PARENCHYMAL HEMORRHAGE: Status: RESOLVED | Noted: 2022-08-16 | Resolved: 2023-02-13

## 2023-02-13 PROBLEM — Z87.898 HISTORY OF SEIZURE: Status: RESOLVED | Noted: 2022-08-16 | Resolved: 2023-02-13

## 2023-02-13 PROBLEM — M54.50 LUMBAR BACK PAIN: Status: RESOLVED | Noted: 2021-02-03 | Resolved: 2023-02-13

## 2023-02-13 RX ORDER — IBUPROFEN 600 MG/1
600 TABLET, FILM COATED ORAL
Qty: 60 | Refills: 0 | Status: DISCONTINUED | COMMUNITY
Start: 2022-05-13 | End: 2023-02-13

## 2023-02-13 RX ORDER — LOTEPREDNOL ETABONATE 10 MG/ML
1 SUSPENSION TOPICAL
Qty: 3 | Refills: 0 | Status: DISCONTINUED | COMMUNITY
Start: 2022-03-09 | End: 2023-02-13

## 2023-02-13 RX ORDER — CHOLECALCIFEROL (VITAMIN D3) 25 MCG
25 MCG TABLET ORAL DAILY
Qty: 90 | Refills: 3 | Status: DISCONTINUED | COMMUNITY
Start: 2017-11-10 | End: 2023-02-13

## 2023-02-13 RX ORDER — CHROMIUM 200 MCG
25 MCG TABLET ORAL DAILY
Qty: 30 | Refills: 6 | Status: DISCONTINUED | COMMUNITY
Start: 2022-11-08 | End: 2023-02-13

## 2023-02-13 RX ORDER — ALCOHOL ANTISEPTIC PADS
33G X 6 MM PADS, MEDICATED (EA) TOPICAL
Qty: 100 | Refills: 5 | Status: DISCONTINUED | COMMUNITY
Start: 2021-02-16 | End: 2023-02-13

## 2023-02-13 RX ORDER — CHLORTHALIDONE 25 MG/1
25 TABLET ORAL
Qty: 45 | Refills: 8 | Status: DISCONTINUED | COMMUNITY
Start: 2017-10-03 | End: 2023-02-13

## 2023-02-13 RX ORDER — BROMFENAC 1.03 MG/ML
0.09 SOLUTION/ DROPS OPHTHALMIC
Qty: 2 | Refills: 0 | Status: DISCONTINUED | COMMUNITY
Start: 2022-03-09 | End: 2023-02-13

## 2023-02-13 RX ORDER — LEVETIRACETAM 500 MG/1
500 TABLET, FILM COATED ORAL TWICE DAILY
Refills: 3 | Status: DISCONTINUED | COMMUNITY
Start: 2022-08-04 | End: 2023-02-13

## 2023-02-13 RX ORDER — BLOOD SUGAR DIAGNOSTIC
STRIP MISCELLANEOUS
Qty: 1 | Refills: 11 | Status: DISCONTINUED | COMMUNITY
Start: 2021-02-16 | End: 2023-02-13

## 2023-02-13 RX ORDER — LOSARTAN POTASSIUM AND HYDROCHLOROTHIAZIDE 12.5; 5 MG/1; MG/1
50-12.5 TABLET ORAL
Qty: 30 | Refills: 6 | Status: DISCONTINUED | COMMUNITY
Start: 2018-11-28 | End: 2023-02-13

## 2023-02-13 NOTE — REVIEW OF SYSTEMS
[Negative] : Heme/Lymph [Memory Loss] : memory loss [FreeTextEntry2] : as noted in HPI [FreeTextEntry9] : as noted in HPI [de-identified] : as noted in HPI

## 2023-02-13 NOTE — CHRONIC CARE ASSESSMENT
[Oriented To Person] : ~L oriented to person [Oriented To Place] : ~L oriented to place [Oriented To Time] : ~L oriented to time [Oriented To Situation] : ~L oriented to situation [Alert] : ~L alert [Reviewed Home Safety Evaluation] : Reviewed home safety evaluation [Safety elements used in home] : Safety elements used in home [Patient Non-adherent to care plan] : patient non-adherent to care plan [Can not Exercise (Disability)] : Exercise: The patient can not exercise due to disability [Diabetic Diet] : diabetic [Low Salt Diet] : low salt [Poor Adherence] : but does not adhere to the diet

## 2023-02-13 NOTE — REASON FOR VISIT
[Post-hospitalization from ___ Hospital] : Post-hospitalization from [unfilled] Hospital [Admitted on: ___] : The patient was admitted on [unfilled] [Discharged on ___] : discharged on [unfilled] [Patient Contacted By: ____] : and contacted by [unfilled] [Follow-Up] : a follow-up visit [Family Member] : family member [Pre-Visit Preparation] : pre-visit preparation was done [Intercurrent Specialty/Sub-specialty Visits] : the patient has no intercurrent specialty/sub-specialty visits [FreeTextEntry1] : for chronic conditions [FreeTextEntry2] : Pt admitted to [facility] for (R) MCA infarct; (R) hemiparesis, dysphagia- received TPA in ER then transferred to rehab - dysphagia & hemiparesis resolved in rehab

## 2023-02-13 NOTE — HISTORY OF PRESENT ILLNESS
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 Patient has not been in close contact with someone covid positive. \par surgical mask used during visit: [Y]. Total face to face time with patient is [35] min.\par \par PMH: Diabetes mellitus type 2 with neuropathy,  hypertension, hyperlipidemia, smoker,  BPH, CVA,  ICH\par \par Pt A&Ox 2-3, more forgetful but reports he is doing ok- ambulated slowly from room but does not have any gait disturbance\par Appetite is good\par Denies constipation\par Has no urinary issues\par \par Multiple CVAs: in last year; on Plavix- non- adherent to medications- will ascertain local pharmacy for pre- packed meds; has some cognitive impairment now\par \par DM:  A1c 7.8 in 4/22- still not checking blood sugars -  on metformin 1000mg BID, glipizide 10 daily,  prescribed Trulicity on d/c but reports he is not taking it because he does not want to stick himself; on gabapentin for neuropathy\par \par CKD: Creat 1.02 GFR 79 on 4/8\par \par HTN: on  amlodipine, losartan, labetelol \par \par HLD:  triglycerides, total cholesterol  normal;  LDL high in 4/22 but decreased from previous- on atorvastatin 80\par \par BPH: on doxazosin- denies urinary issues-\par \par

## 2023-02-13 NOTE — COUNSELING
[Improve mobility] : improve mobility [Improve weight] : improve weight [Discussed disease trajectory with patient/caregiver] : discussed disease trajectory with patient/caregiver [Completed Healthcare Proxy] : completed healthcare proxy [Completed Medical Orders for Life-Sustaining Treatment] : completed medical orders for life-sustaining treatment [Full Code] : Code Status: Full Code [No Limitations] : Treatment Guidelines: No limitations [Trial of Intubation] : Intubation: Trial of Intubation [[facility] Date: _____] [facility] Com[facility]/last verification date: [unfilled] [_____] : HCP: [unfilled] [Normal Weight - ( BMI  <25 )] : normal weight - ( BMI  <25 ) [DASH diet recommended] : DASH diet recommended [Smoker, not interested in quitting] : smoker, not interested in quitting [] : abdominal aortic ultrasound [Date: ___] : diabetic screening completed on [unfilled]

## 2023-02-13 NOTE — CURRENT MEDS
[Medication and Allergies Reconciled] : medication and allergies reconciled [High Risk Medications Reviewed and Reconciled (Beers Criteria)] : high risk medications reviewed, reconciled [Reviewed patient reported medication adherence from Comprehensive Assessment] : reviewed patient reported medication adherence from comprehensive assessment [Non adherent to medications as prescribed] : the patient is non adherent to medications as prescribed

## 2023-02-13 NOTE — PHYSICAL EXAM
[No Acute Distress] : no acute distress [Well Nourished] : well nourished [Well Developed] : well developed [Normal Sclera/Conjunctiva] : normal sclera/conjunctiva [Normal Outer Ear/Nose] : the ears and nose were normal in appearance [Normal Oropharynx] : the oropharynx was normal [No JVD] : no jugular venous distention [No Respiratory Distress] : no respiratory distress [Clear to Auscultation] : lungs were clear to auscultation bilaterally [No Accessory Muscle Use] : no accessory muscle use [Normal Rate] : heart rate was normal  [Regular Rhythm] : with a regular rhythm [Normal S1, S2] : normal S1 and S2 [No Murmurs] : no murmurs heard [No Edema] : there was no peripheral edema [Normal Bowel Sounds] : normal bowel sounds [Non Tender] : non-tender [Soft] : abdomen soft [Not Distended] : not distended [No CVA Tenderness] : no ~M costovertebral angle tenderness [No Spinal Tenderness] : no spinal tenderness [No Joint Swelling] : no joint swelling seen [No Clubbing, Cyanosis] : no clubbing  or cyanosis of the fingernails [Normal Strength/Tone] : muscle strength and tone were normal [No Rash] : no rash [No Skin Lesions] : no skin lesions [Oriented x3] : oriented to person, place, and time [Normal Affect] : the affect was normal [Normal Voice/Communication] : normal voice communication [Normal TMs] : both tympanic membranes were normal [Supple] : the neck was supple [de-identified] : more forgetful [de-identified] : ambulating slowly

## 2023-02-21 RX ORDER — LANCETS 30 GAUGE
EACH MISCELLANEOUS
Qty: 1 | Refills: 0 | Status: DISCONTINUED | COMMUNITY
Start: 2021-02-16 | End: 2023-02-21

## 2023-02-22 ENCOUNTER — NON-APPOINTMENT (OUTPATIENT)
Age: 73
End: 2023-02-22

## 2023-03-06 ENCOUNTER — NON-APPOINTMENT (OUTPATIENT)
Age: 73
End: 2023-03-06

## 2023-03-07 ENCOUNTER — APPOINTMENT (OUTPATIENT)
Dept: HOME HEALTH SERVICES | Facility: HOME HEALTH | Age: 73
End: 2023-03-07

## 2023-03-07 ENCOUNTER — NON-APPOINTMENT (OUTPATIENT)
Age: 73
End: 2023-03-07

## 2023-03-07 VITALS
RESPIRATION RATE: 16 BRPM | TEMPERATURE: 98.1 F | SYSTOLIC BLOOD PRESSURE: 140 MMHG | HEART RATE: 82 BPM | HEIGHT: 68 IN | OXYGEN SATURATION: 98 % | DIASTOLIC BLOOD PRESSURE: 80 MMHG

## 2023-04-05 ENCOUNTER — NON-APPOINTMENT (OUTPATIENT)
Age: 73
End: 2023-04-05

## 2023-04-10 ENCOUNTER — APPOINTMENT (OUTPATIENT)
Dept: HOME HEALTH SERVICES | Facility: HOME HEALTH | Age: 73
End: 2023-04-10
Payer: MEDICARE

## 2023-04-10 VITALS
OXYGEN SATURATION: 100 % | TEMPERATURE: 98.1 F | HEART RATE: 90 BPM | RESPIRATION RATE: 16 BRPM | SYSTOLIC BLOOD PRESSURE: 160 MMHG | DIASTOLIC BLOOD PRESSURE: 70 MMHG

## 2023-04-10 DIAGNOSIS — M47.22 OTHER SPONDYLOSIS WITH RADICULOPATHY, CERVICAL REGION: ICD-10-CM

## 2023-04-10 DIAGNOSIS — G56.03 CARPAL TUNNEL SYNDROM,BILATERAL UPPER LIMBS: ICD-10-CM

## 2023-04-10 DIAGNOSIS — Z86.73 PERSONAL HISTORY OF TRANSIENT ISCHEMIC ATTACK (TIA), AND CEREBRAL INFARCTION W/OUT RESIDUAL DEFICITS: ICD-10-CM

## 2023-04-10 PROCEDURE — 99349 HOME/RES VST EST MOD MDM 40: CPT

## 2023-04-10 NOTE — COUNSELING
[Normal Weight - ( BMI  <25 )] : normal weight - ( BMI  <25 ) [DASH diet recommended] : DASH diet recommended [Smoker, not interested in quitting] : smoker, not interested in quitting [] : abdominal aortic ultrasound [Date: ___] : diabetic screening completed on [unfilled] [Improve mobility] : improve mobility [Improve weight] : improve weight [Discussed disease trajectory with patient/caregiver] : discussed disease trajectory with patient/caregiver [Completed Healthcare Proxy] : completed healthcare proxy [Completed Medical Orders for Life-Sustaining Treatment] : completed medical orders for life-sustaining treatment [Full Code] : Code Status: Full Code [No Limitations] : Treatment Guidelines: No limitations [Trial of Intubation] : Intubation: Trial of Intubation [[facility]: _____] [facility]last verification date: [unfilled] [_____] : HCP: [unfilled]

## 2023-04-10 NOTE — COUNSELING
[Normal Weight - ( BMI  <25 )] : normal weight - ( BMI  <25 ) [DASH diet recommended] : DASH diet recommended [Smoker, not interested in quitting] : smoker, not interested in quitting [] : abdominal aortic ultrasound [Date: ___] : diabetic screening completed on [unfilled] [Improve mobility] : improve mobility [Improve weight] : improve weight [Discussed disease trajectory with patient/caregiver] : discussed disease trajectory with patient/caregiver [Completed Healthcare Proxy] : completed healthcare proxy [Completed Medical Orders for Life-Sustaining Treatment] : completed medical orders for life-sustaining treatment [Full Code] : Code Status: Full Code [No Limitations] : Treatment Guidelines: No limitations [Trial of Intubation] : Intubation: Trial of Intubation [Last [facility] Date: _____] [facility] Completion/last verification date: [unfilled] [_____] : HCP: [unfilled]

## 2023-04-12 ENCOUNTER — LABORATORY RESULT (OUTPATIENT)
Age: 73
End: 2023-04-12

## 2023-04-13 ENCOUNTER — LABORATORY RESULT (OUTPATIENT)
Age: 73
End: 2023-04-13

## 2023-04-14 ENCOUNTER — LABORATORY RESULT (OUTPATIENT)
Age: 73
End: 2023-04-14

## 2023-04-18 PROBLEM — G56.03 BILATERAL CARPAL TUNNEL SYNDROME: Status: RESOLVED | Noted: 2019-06-07 | Resolved: 2023-04-18

## 2023-04-18 PROBLEM — M47.22 CERVICAL SPONDYLOSIS WITH RADICULOPATHY: Status: ACTIVE | Noted: 2023-04-18

## 2023-04-18 PROBLEM — Z86.73 CEREBRAL INFARCTION, CHRONIC: Status: ACTIVE | Noted: 2023-04-18

## 2023-04-18 NOTE — HEALTH RISK ASSESSMENT
[Independent] : using telephone [Some assistance needed] : shopping [Full assistance needed] : managing medications [] : managing finances [No falls in past year] : Patient reported no falls in the past year [Yes] : The patient has visual impairment [HRA Reviewed] : Health risk assessment reviewed [[facility]] : 30

## 2023-04-18 NOTE — CURRENT MEDS
[Non adherent to medications as prescribed] : the patient is non adherent to medications as prescribed [Medication and Allergies Reconciled] : medication and allergies reconciled [High Risk Medications Reviewed and Reconciled (Beers Criteria)] : high risk medications reviewed and reconciled [Reviewed patient reported medication adherence from Comprehensive Assessment] : Reviewed patient reported medication adherence from comprehensive assessment [Non adherent to medications] : Patient is non adherent to medications as prescribed

## 2023-04-18 NOTE — HISTORY OF PRESENT ILLNESS
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] : PMH: Diabetes mellitus type 2 with neuropathy,  hypertension, hyperlipidemia, smoker,  BPH, CVA,  ICH\par \par Pt A&Ox 2-3, \par -Saw "another doctor for the stroke"- does not know name\par \par - Was in ER on Saturday 4/8  for (L) hand pain- swelling of 2nd & 3rd finger -diagnosed with cervical radiculopathy supposed to be wearing a sling- he is not; reports he received IV steroids but cannot make a fist\par       CT brain with chronic right sided cerebral infarct\par       C5-C6 & C6-C7 moderate spondylosis\par       US (L) arm (-) for DVT\par       (L) hand xray negative\par \par -Now has pre-pour meds from [facility] says he is taking them but on Wednesday group of medications and it is Monday - also called old pharmacy for refills\par - Was in [place] for 3 weeks in March \par \par Remains forgetful, confused still ambulating slowly from room but does not have any gait disturbance\par Appetite is good\par Denies constipation\par Has no urinary issues\par \par Multiple CVAs: in last year; on Plavix; remain non- adherent to medications even with pre-pour medications - grandson is helping but \par \par DM:  A1c 7.8 in 4/22- still not checking blood sugars -  on metformin 1000mg BID, glipizide 10 daily; will not take Trulicity or perform fingersticks as he does not want to stick himself; on gabapentin for neuropathy\par \par CKD: Creat 1.02 GFR 79 on 4/8\par \par HTN: on  amlodipine, losartan, labetalol \par \par HLD:  triglycerides, total cholesterol  normal;  LDL high in 4/22 but decreased from previous- on atorvastatin 80\par \par BPH: on doxazosin- denies urinary issues-\par \par

## 2023-04-18 NOTE — CHRONIC CARE ASSESSMENT
[Oriented To Place] : ~L oriented to place [Reviewed Home Safety Evaluation] : Reviewed home safety evaluation [Safety elements used in home] : Safety elements used in home [Patient Non-adherent to care plan] : patient non-adherent to care plan [Can not Exercise (Disability)] : Exercise: The patient can not exercise due to disability [Diabetic Diet] : diabetic [Low Salt Diet] : low salt [Poor Adherence] : but does not adhere to the diet [Oriented To Person] : ~L oriented to person [Alert] : ~L alert [Oriented To Time] : not ~L oriented to time [Oriented To Situation] : not ~L oriented to situation

## 2023-04-18 NOTE — REVIEW OF SYSTEMS
[Memory Loss] : memory loss [Negative] : Heme/Lymph [FreeTextEntry2] : as noted in HPI [FreeTextEntry9] : as noted in HPI [de-identified] : as noted in HPI

## 2023-04-18 NOTE — PHYSICAL EXAM
[Well Nourished] : well nourished [Well Developed] : well developed [Normal Oropharynx] : the oropharynx was normal [Normal TMs] : both tympanic membranes were normal [Supple] : the neck was supple [No CVA Tenderness] : no ~M costovertebral angle tenderness [Normal Strength/Tone] : muscle strength and tone were normal [Oriented x3] : oriented to person, place, and time [Normal Affect] : the affect was normal [No Acute Distress] : no acute distress [Normal Voice/Communication] : normal voice communication [Normal Sclera/Conjunctiva] : normal sclera/conjunctiva [Normal Outer Ear/Nose] : the ears and nose were normal in appearance [No JVD] : no jugular venous distention [No Respiratory Distress] : no respiratory distress [Clear to Auscultation] : lungs were clear to auscultation bilaterally [No Accessory Muscle Use] : no accessory muscle use [Normal Rate] : heart rate was normal  [Regular Rhythm] : with a regular rhythm [Normal S1, S2] : normal S1 and S2 [No Murmurs] : no murmurs heard [No Edema] : there was no peripheral edema [Normal Bowel Sounds] : normal bowel sounds [Non Tender] : non-tender [Soft] : abdomen soft [Not Distended] : not distended [No Spinal Tenderness] : no spinal tenderness [No Joint Swelling] : no joint swelling seen [No Clubbing, Cyanosis] : no clubbing  or cyanosis of the fingernails [No Rash] : no rash [No Skin Lesions] : no skin lesions [Kyphosis] : no kyphosis present [Scoliosis] : scoliosis not present [de-identified] : confused  [de-identified] : slow, shuffling gait [de-identified] : A&Ox2, more confused

## 2023-04-18 NOTE — REVIEW OF SYSTEMS
[Memory Loss] : memory loss [Negative] : Heme/Lymph [FreeTextEntry2] : as noted in HPI [FreeTextEntry9] : as noted in HPI [de-identified] : as noted in HPI

## 2023-04-18 NOTE — PHYSICAL EXAM
[Well Nourished] : well nourished [Well Developed] : well developed [Normal Oropharynx] : the oropharynx was normal [Normal TMs] : both tympanic membranes were normal [Supple] : the neck was supple [No CVA Tenderness] : no ~M costovertebral angle tenderness [Normal Strength/Tone] : muscle strength and tone were normal [Oriented x3] : oriented to person, place, and time [Normal Affect] : the affect was normal [No Acute Distress] : no acute distress [Normal Voice/Communication] : normal voice communication [Normal Sclera/Conjunctiva] : normal sclera/conjunctiva [Normal Outer Ear/Nose] : the ears and nose were normal in appearance [No JVD] : no jugular venous distention [No Respiratory Distress] : no respiratory distress [Clear to Auscultation] : lungs were clear to auscultation bilaterally [No Accessory Muscle Use] : no accessory muscle use [Normal Rate] : heart rate was normal  [Regular Rhythm] : with a regular rhythm [Normal S1, S2] : normal S1 and S2 [No Murmurs] : no murmurs heard [No Edema] : there was no peripheral edema [Normal Bowel Sounds] : normal bowel sounds [Non Tender] : non-tender [Soft] : abdomen soft [Not Distended] : not distended [No Spinal Tenderness] : no spinal tenderness [No Joint Swelling] : no joint swelling seen [No Clubbing, Cyanosis] : no clubbing  or cyanosis of the fingernails [No Rash] : no rash [No Skin Lesions] : no skin lesions [Kyphosis] : no kyphosis present [Scoliosis] : scoliosis not present [de-identified] : confused  [de-identified] : slow, shuffling gait [de-identified] : A&Ox2, more confused

## 2023-04-18 NOTE — HEALTH RISK ASSESSMENT
[Independent] : using telephone [Some assistance needed] : shopping [Full assistance needed] : managing medications [] : managing finances [No falls in past year] : Patient reported no falls in the past year [Yes] : The patient has visual impairment [HRA Reviewed] : Health risk assessment reviewed [[facility]] : 30

## 2023-04-18 NOTE — HISTORY OF PRESENT ILLNESS
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] : PMH: Diabetes mellitus type 2 with neuropathy,  hypertension, hyperlipidemia, smoker,  BPH, CVA,  ICH\par \par Pt A&Ox 2-3, \par -Saw "another doctor for the stroke"- does not know name\par \par - Was in ER on Saturday 4/8  for (L) hand pain- swelling of 2nd & 3rd finger -diagnosed with cervical radiculopathy supposed to be wearing a sling- he is not; reports he received IV steroids but cannot make a fist\par       CT brain with chronic right sided cerebral infarct\par       C5-C6 & C6-C7 moderate spondylosis\par       US (L) arm (-) for DVT\par       (L) hand xray negative\par \par -Now has pre-pour meds from [facility] says he is taking them but on Wednesday group of medications and it is Monday - also called old pharmacy for refills\par - Was in [place] for 3 weeks in March \par \par Remains forgetful, confused still ambulating slowly from room but does not have any gait disturbance\par Appetite is good\par Denies constipation\par Has no urinary issues\par \par Multiple CVAs: in last year; on Plavix; remain non- adherent to medications even with pre-pour medications - grandson is helping but \par \par DM:  A1c 7.8 in 4/22- still not checking blood sugars -  on metformin 1000mg BID, glipizide 10 daily; will not take Trulicity or perform fingersticks as he does not want to stick himself; on gabapentin for neuropathy\par \par CKD: Creat 1.02 GFR 79 on 4/8\par \par HTN: on  amlodipine, losartan, labetalol \par \par HLD:  triglycerides, total cholesterol  normal;  LDL high in 4/22 but decreased from previous- on atorvastatin 80\par \par BPH: on doxazosin- denies urinary issues-\par \par

## 2023-04-20 ENCOUNTER — FORM ENCOUNTER (OUTPATIENT)
Age: 73
End: 2023-04-20

## 2023-04-30 ENCOUNTER — TRANSCRIPTION ENCOUNTER (OUTPATIENT)
Age: 73
End: 2023-04-30

## 2023-05-26 ENCOUNTER — RX RENEWAL (OUTPATIENT)
Age: 73
End: 2023-05-26

## 2023-05-30 ENCOUNTER — RX RENEWAL (OUTPATIENT)
Age: 73
End: 2023-05-30

## 2023-05-31 ENCOUNTER — NON-APPOINTMENT (OUTPATIENT)
Age: 73
End: 2023-05-31

## 2023-06-05 ENCOUNTER — RX RENEWAL (OUTPATIENT)
Age: 73
End: 2023-06-05

## 2023-06-06 ENCOUNTER — RX RENEWAL (OUTPATIENT)
Age: 73
End: 2023-06-06

## 2023-06-07 ENCOUNTER — NON-APPOINTMENT (OUTPATIENT)
Age: 73
End: 2023-06-07

## 2023-06-14 ENCOUNTER — RX RENEWAL (OUTPATIENT)
Age: 73
End: 2023-06-14

## 2023-06-14 ENCOUNTER — APPOINTMENT (OUTPATIENT)
Dept: HOME HEALTH SERVICES | Facility: HOME HEALTH | Age: 73
End: 2023-06-14

## 2023-06-14 VITALS
OXYGEN SATURATION: 98 % | SYSTOLIC BLOOD PRESSURE: 160 MMHG | HEART RATE: 86 BPM | RESPIRATION RATE: 17 BRPM | DIASTOLIC BLOOD PRESSURE: 80 MMHG | TEMPERATURE: 98 F

## 2023-06-16 ENCOUNTER — APPOINTMENT (OUTPATIENT)
Dept: HOME HEALTH SERVICES | Facility: HOME HEALTH | Age: 73
End: 2023-06-16

## 2023-06-22 ENCOUNTER — NON-APPOINTMENT (OUTPATIENT)
Age: 73
End: 2023-06-22

## 2023-08-09 ENCOUNTER — NON-APPOINTMENT (OUTPATIENT)
Age: 73
End: 2023-08-09

## 2023-09-08 ENCOUNTER — APPOINTMENT (OUTPATIENT)
Dept: HOME HEALTH SERVICES | Facility: HOME HEALTH | Age: 73
End: 2023-09-08

## 2023-09-14 ENCOUNTER — NON-APPOINTMENT (OUTPATIENT)
Age: 73
End: 2023-09-14

## 2023-09-15 ENCOUNTER — APPOINTMENT (OUTPATIENT)
Dept: HOME HEALTH SERVICES | Facility: HOME HEALTH | Age: 73
End: 2023-09-15

## 2023-09-15 VITALS
OXYGEN SATURATION: 99 % | HEART RATE: 75 BPM | DIASTOLIC BLOOD PRESSURE: 70 MMHG | SYSTOLIC BLOOD PRESSURE: 160 MMHG | RESPIRATION RATE: 17 BRPM | TEMPERATURE: 97.8 F

## 2023-09-19 ENCOUNTER — LABORATORY RESULT (OUTPATIENT)
Age: 73
End: 2023-09-19

## 2023-09-22 ENCOUNTER — NON-APPOINTMENT (OUTPATIENT)
Age: 73
End: 2023-09-22

## 2023-09-22 LAB
APPEARANCE: ABNORMAL
BACTERIA UR CULT: NORMAL
BILIRUBIN URINE: NEGATIVE
BLOOD URINE: ABNORMAL
COLOR: YELLOW
GLUCOSE QUALITATIVE U: NEGATIVE MG/DL
KETONES URINE: NEGATIVE MG/DL
LEUKOCYTE ESTERASE URINE: ABNORMAL
NITRITE URINE: NEGATIVE
PH URINE: 6
PROTEIN URINE: NEGATIVE MG/DL
SPECIFIC GRAVITY URINE: 1.02
UROBILINOGEN URINE: 0.2 MG/DL

## 2023-10-25 NOTE — FAMILY HISTORY
Chief complaint:  Cough (X 1 week.  made them come )      HPI:  Francisco Leung is a 2 y.o. male presenting with around 5 days of upper respiratory symptoms with cough and congestion.  Decreased activity.  No confusion.  He is sleeping well.  Decreased appetite although he is still taking fluids.  Normal urination.  No emesis or diarrhea.  He is up-to-date on immunizations.    ROS: As per HPI and below:  No rash, swelling, abdominal pain, difficulty breathing, syncope.    Review of patient's allergies indicates:   Allergen Reactions    Amoxicillin Rash       Patient's Medications   New Prescriptions    No medications on file   Previous Medications    CETIRIZINE (ZYRTEC) 1 MG/ML SYRUP    GIVE 2.5 ML BY MOUTH DAILY    IBUPROFEN (ADVIL,MOTRIN) 100 MG/5 ML SUSPENSION    Take by mouth every 6 (six) hours as needed for Temperature greater than.    NYSTATIN (MYCOSTATIN) 100,000 UNIT/ML SUSPENSION    Take 5 mLs by mouth 4 (four) times daily.    NYSTATIN (MYCOSTATIN) OINTMENT    Apply topically 2 (two) times daily.   Modified Medications    No medications on file   Discontinued Medications    No medications on file       PMH:  As per HPI and below:  No past medical history on file.  No past surgical history on file.    Social History     Socioeconomic History    Marital status: Single       No family history on file.    Physical Exam:    Vitals:    10/25/23 0000   Pulse: (!) 144   Resp: 24   Temp: 98.2 °F (36.8 °C)     GENERAL:  Non-toxic appearing.  Calm and consolable.    HEENT:  Moist mucous membranes.  Normocephalic and atraumatic.  Nasal congestion noted.  NECK:  No swelling.  Midline trachea.  No stridor.  CARDIOVASCULAR:  Regular rate and rhythm.  2+ radial pulses.  No murmur auscultated.  PULMONARY:  Scattered rhonchi with no rales or wheezes.  No tachypnea or accessory muscle use.  No nasal flaring.  ABDOMEN:  Non-tender and non-distended.  No masses.    EXTREMITIES:  Warm and well perfused.  Brisk capillary  refill.  No peripheral edema.  NEUROLOGICAL:  Moving all extremities well.  Normal tone.    SKIN:  No rashes or ecchymoses.      Labs Reviewed   RSV ANTIGEN DETECTION - Abnormal; Notable for the following components:       Result Value    RSV Ag by Molecular Method Positive (*)     All other components within normal limits    Narrative:     RSV  critical result(s) called and verbal readback obtained from   Jillian Luke RN. by ROSEANNA 10/25/2023 01:23   GROUP A STREP, MOLECULAR   INFLUENZA A & B BY MOLECULAR   SARS-COV-2 RNA AMPLIFICATION, QUAL       Current Discharge Medication List        CONTINUE these medications which have NOT CHANGED    Details   cetirizine (ZYRTEC) 1 mg/mL syrup GIVE 2.5 ML BY MOUTH DAILY      ibuprofen (ADVIL,MOTRIN) 100 mg/5 mL suspension Take by mouth every 6 (six) hours as needed for Temperature greater than.      nystatin (MYCOSTATIN) 100,000 unit/mL suspension Take 5 mLs by mouth 4 (four) times daily.      nystatin (MYCOSTATIN) ointment Apply topically 2 (two) times daily.  Qty: 30 g, Refills: 2    Associated Diagnoses: Candidiasis             Orders Placed This Encounter   Procedures    Group A Strep, Molecular    Influenza A & B by Molecular    COVID-19 Rapid Screening    RSV Antigen Detection Nasopharyngeal Swab       Imaging Results    None              MDM:    2 y.o. male with upper respiratory symptoms consistent with viral URI.  He is positive RSV.  He has multiple sick contacts with similar symptoms.  I doubt serious bacterial infection or sepsis.  No increased work of breathing or hypoxia.  I do not think requires admission for observation.  Nasal suctioning as needed at home reviewed along with pediatrician follow-up.  Return precautions reviewed as well.    Diagnoses:    1. Viral URI  2.  RSV       Juan Jose Bernal MD  10/25/23 0214     [Family History Reviewed and Updated] : family history reviewed and updated

## 2023-12-08 ENCOUNTER — NON-APPOINTMENT (OUTPATIENT)
Age: 73
End: 2023-12-08

## 2024-02-08 NOTE — LETTER HEADER
18 [Care Plan reviewed and provided to patient/caregiver] : Care plan reviewed and provided to patient/caregiver [Care Plan reviewed every ___ weeks] : Care plan reviewed every [unfilled] weeks [Care Plan managed/Care coordinated by: ___] : Care plan managed/Care coordinated by: [unfilled] [Initiation or substantial revisions made to care plan involving mod/high medical decision making for complex CCM] : Initiation or substantial revisions made to care plan involving mod/high medical decision making for complex CCM [Patient/Caregiver agrees to have other providers send summary of their care to this office] : Patient/caregiver agrees to have other providers send summary of their care to this office

## 2024-04-22 ENCOUNTER — APPOINTMENT (OUTPATIENT)
Dept: HOME HEALTH SERVICES | Facility: HOME HEALTH | Age: 74
End: 2024-04-22

## 2024-05-03 ENCOUNTER — TRANSCRIPTION ENCOUNTER (OUTPATIENT)
Age: 74
End: 2024-05-03

## 2024-05-06 ENCOUNTER — APPOINTMENT (OUTPATIENT)
Dept: HOME HEALTH SERVICES | Facility: HOME HEALTH | Age: 74
End: 2024-05-06
Payer: MEDICARE

## 2024-05-06 VITALS
SYSTOLIC BLOOD PRESSURE: 150 MMHG | DIASTOLIC BLOOD PRESSURE: 80 MMHG | RESPIRATION RATE: 16 BRPM | TEMPERATURE: 97.9 F | OXYGEN SATURATION: 97 % | HEART RATE: 88 BPM

## 2024-05-06 DIAGNOSIS — E11.40 TYPE 2 DIABETES MELLITUS WITH DIABETIC NEUROPATHY, UNSPECIFIED: ICD-10-CM

## 2024-05-06 DIAGNOSIS — E11.42 TYPE 2 DIABETES MELLITUS WITH DIABETIC POLYNEUROPATHY: ICD-10-CM

## 2024-05-06 DIAGNOSIS — I63.9 CEREBRAL INFARCTION, UNSPECIFIED: ICD-10-CM

## 2024-05-06 DIAGNOSIS — E78.5 TYPE 2 DIABETES MELLITUS WITH OTHER SPECIFIED COMPLICATION: ICD-10-CM

## 2024-05-06 DIAGNOSIS — Z87.898 PERSONAL HISTORY OF OTHER SPECIFIED CONDITIONS: ICD-10-CM

## 2024-05-06 DIAGNOSIS — G81.91 HEMIPLEGIA, UNSPECIFIED AFFECTING RIGHT DOMINANT SIDE: ICD-10-CM

## 2024-05-06 DIAGNOSIS — N18.30 CHRONIC KIDNEY DISEASE, STAGE 3 UNSPECIFIED: ICD-10-CM

## 2024-05-06 DIAGNOSIS — R41.89 CEREBRAL INFARCTION, UNSPECIFIED: ICD-10-CM

## 2024-05-06 DIAGNOSIS — E11.69 TYPE 2 DIABETES MELLITUS WITH OTHER SPECIFIED COMPLICATION: ICD-10-CM

## 2024-05-06 DIAGNOSIS — E11.65 TYPE 2 DIABETES MELLITUS WITH DIABETIC NEUROPATHY, UNSPECIFIED: ICD-10-CM

## 2024-05-06 DIAGNOSIS — I10 ESSENTIAL (PRIMARY) HYPERTENSION: ICD-10-CM

## 2024-05-06 DIAGNOSIS — E11.21 TYPE 2 DIABETES MELLITUS WITH DIABETIC NEPHROPATHY: ICD-10-CM

## 2024-05-06 DIAGNOSIS — I63.511 CEREBRAL INFARCTION DUE TO UNSPECIFIED OCCLUSION OR STENOSIS OF RIGHT MIDDLE CEREBRAL ARTERY: ICD-10-CM

## 2024-05-06 PROCEDURE — 99349 HOME/RES VST EST MOD MDM 40: CPT

## 2024-05-07 ENCOUNTER — LABORATORY RESULT (OUTPATIENT)
Age: 74
End: 2024-05-07

## 2024-05-08 ENCOUNTER — LABORATORY RESULT (OUTPATIENT)
Age: 74
End: 2024-05-08

## 2024-05-08 LAB
A1CG - A1C WITH ESTIMATED AVERAGE GLUCOSE: NORMAL
CBC W/ AUTO DIFF: NORMAL
COMPREHENSIVE METABOLIC PANEL: NORMAL
IRON WITH TOTAL BINDING CAPACITY: NORMAL
TSH SERPL-ACNC: NORMAL

## 2024-05-09 ENCOUNTER — LABORATORY RESULT (OUTPATIENT)
Age: 74
End: 2024-05-09

## 2024-05-13 PROBLEM — N18.30 CKD (CHRONIC KIDNEY DISEASE), STAGE III: Status: ACTIVE | Noted: 2019-10-23

## 2024-05-13 PROBLEM — E11.69 TYPE 2 DIABETES MELLITUS WITH HYPERLIPIDEMIA: Status: ACTIVE | Noted: 2018-10-17

## 2024-05-13 PROBLEM — I63.9 ACUTE CVA (CEREBROVASCULAR ACCIDENT): Status: RESOLVED | Noted: 2022-06-16 | Resolved: 2022-08-16

## 2024-05-13 PROBLEM — G81.91 RIGHT HEMIPARESIS: Status: RESOLVED | Noted: 2023-02-13 | Resolved: 2024-05-13

## 2024-05-13 PROBLEM — E11.42 TYPE 2 DIABETES MELLITUS WITH PERIPHERAL NEUROPATHY: Status: ACTIVE | Noted: 2018-10-17

## 2024-05-13 PROBLEM — E11.21 TYPE 2 DIABETES MELLITUS WITH NEPHROPATHY: Status: ACTIVE | Noted: 2023-02-13

## 2024-05-13 PROBLEM — I63.511 CEREBRAL INFARCTION INVOLVING MIDDLE CEREBRAL ARTERY, RIGHT: Status: RESOLVED | Noted: 2023-02-13 | Resolved: 2024-05-13

## 2024-05-13 PROBLEM — Z87.898 HISTORY OF DYSURIA: Status: RESOLVED | Noted: 2023-09-18 | Resolved: 2024-05-13

## 2024-05-13 PROBLEM — I63.9: Status: ACTIVE | Noted: 2023-02-13

## 2024-05-13 NOTE — CHRONIC CARE ASSESSMENT
[Oriented To Person] : ~L oriented to person [Oriented To Place] : ~L oriented to place [Alert] : ~L alert [Reviewed Home Safety Evaluation] : Reviewed home safety evaluation [Safety elements used in home] : Safety elements used in home [Patient Non-adherent to care plan] : patient non-adherent to care plan [Can not Exercise (Disability)] : Exercise: The patient can not exercise due to disability [Diabetic Diet] : diabetic [Low Salt Diet] : low salt [Poor Adherence] : but does not adhere to the diet [Oriented To Time] : not ~L oriented to time [Oriented To Situation] : not ~L oriented to situation

## 2024-05-13 NOTE — PHYSICAL EXAM
[No Acute Distress] : no acute distress [Normal Voice/Communication] : normal voice communication [Normal Sclera/Conjunctiva] : normal sclera/conjunctiva [Normal Outer Ear/Nose] : the ears and nose were normal in appearance [No JVD] : no jugular venous distention [No Respiratory Distress] : no respiratory distress [Clear to Auscultation] : lungs were clear to auscultation bilaterally [No Accessory Muscle Use] : no accessory muscle use [Normal Rate] : heart rate was normal  [Regular Rhythm] : with a regular rhythm [Normal S1, S2] : normal S1 and S2 [No Murmurs] : no murmurs heard [No Edema] : there was no peripheral edema [Normal Bowel Sounds] : normal bowel sounds [Non Tender] : non-tender [Soft] : abdomen soft no [Not Distended] : not distended [No Spinal Tenderness] : no spinal tenderness [No Joint Swelling] : no joint swelling seen [No Clubbing, Cyanosis] : no clubbing  or cyanosis of the fingernails [No Rash] : no rash [No Skin Lesions] : no skin lesions [Normal TMs] : both tympanic membranes were normal [Kyphosis] : no kyphosis present [Scoliosis] : scoliosis not present [de-identified] : A&Ox2-3 [de-identified] : slow, shuffling gait [de-identified] : A&Ox2, more confused

## 2024-05-13 NOTE — COUNSELING
[Normal Weight - ( BMI  <25 )] : normal weight - ( BMI  <25 ) [DASH diet recommended] : DASH diet recommended [Smoker, not interested in quitting] : smoker, not interested in quitting [] : abdominal aortic ultrasound [Date: ___] : diabetic screening completed on [unfilled] [Improve mobility] : improve mobility [Improve weight] : improve weight [Discussed disease trajectory with patient/caregiver] : discussed disease trajectory with patient/caregiver [Completed Healthcare Proxy] : completed healthcare proxy [Completed Medical Orders for Life-Sustaining Treatment] : completed medical orders for life-sustaining treatment [Full Code] : Code Status: Full Code [No Limitations] : Treatment Guidelines: No limitations [Trial of Intubation] : Intubation: Trial of Intubation [_____] : HCP: [unfilled] [Last [facility] Date: _____] [facility] Completion/last verification date: [unfilled]

## 2024-05-13 NOTE — HISTORY OF PRESENT ILLNESS
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] : PMH: Diabetes mellitus type 2 with neuropathy,  hypertension, hyperlipidemia, smoker,  BPH, CVA,  ICH  Pt A&Ox 2-3- Riding scooter in yard on arrival, Able to walk up 4 steps into the house but needs a cane to help him as there are no railings- will order - Was in [place] from November 2023 until March 2024 and hospitalized while there but does not remember why - Reports numbness of hands continues since CVA - has a hard time manipulating utensils, lighter as he is still smoking Still not checking finger sticks but would like Freestyle Mildred device - report Eyesight still bad never had 2nd cataract surgery - Toes & fingernails long  - Appetite is good- no weight loss - Denies constipation - Has no urinary issues - Daughter now pre-pouring medications for him as he no longer uses mail order pharmacy  -  Multiple CVAs (2023) on Plavix; CT brain  in 4/2023 with chronic right sided cerebral infarct -  DM:  A1c 7.8 in 4/22- still not checking blood sugars -  on metformin 500mg BID, glipizide 10 BID; will not take Trulicity or perform fingerstick as he does not want to stick himself; on gabapentin 300mg TID for neuropathy - CKD: Creat 1.02 GFR 79 in 4/2023 - HTN: on amlodipine, losartan, labetalol  - HLD:  triglycerides, total cholesterol  normal;  LDL high in 4/22 but decreased from previous- on atorvastatin 80 - BPH: on doxazosin- denies urinary issues- -  C5-C6 & C6-C7 moderate spondylosis

## 2024-05-13 NOTE — HEALTH RISK ASSESSMENT
[Independent] : using telephone [Some assistance needed] : shopping [Full assistance needed] : managing medications [] : managing finances [No falls in past year] : Patient reported no falls in the past year [Yes] : The patient has visual impairment [HRA Reviewed] : Health Risk Assessment reviewed [[facility]] : 30

## 2024-05-13 NOTE — REASON FOR VISIT
[Family Member] : family member [Pre-Visit Preparation] : pre-visit preparation was done [Intercurrent Specialty/Sub-specialty Visits] : the patient has intercurrent specialty/sub-specialty visits [Follow-Up] : a follow-up visit [FreeTextEntry1] : for Diabetes mellitus type 2 with neuropathy,  hypertension, hyperlipidemia

## 2024-05-13 NOTE — REVIEW OF SYSTEMS
[Memory Loss] : memory loss [Negative] : Heme/Lymph [FreeTextEntry2] : as noted in HPI [FreeTextEntry9] : as noted in HPI [de-identified] : as noted in HPI

## 2024-05-14 DIAGNOSIS — K59.00 CONSTIPATION, UNSPECIFIED: ICD-10-CM

## 2024-06-04 ENCOUNTER — RX RENEWAL (OUTPATIENT)
Age: 74
End: 2024-06-04

## 2024-06-19 ENCOUNTER — APPOINTMENT (OUTPATIENT)
Dept: HOME HEALTH SERVICES | Facility: HOME HEALTH | Age: 74
End: 2024-06-19

## 2024-06-19 VITALS
RESPIRATION RATE: 17 BRPM | DIASTOLIC BLOOD PRESSURE: 60 MMHG | TEMPERATURE: 98 F | OXYGEN SATURATION: 98 % | SYSTOLIC BLOOD PRESSURE: 110 MMHG | HEART RATE: 58 BPM

## 2024-06-19 RX ORDER — LABETALOL HYDROCHLORIDE 200 MG/1
200 TABLET, FILM COATED ORAL
Qty: 180 | Refills: 3 | Status: ACTIVE | COMMUNITY
Start: 2023-02-13

## 2024-06-19 RX ORDER — LACTULOSE 10 G/15ML
10 SOLUTION ORAL
Qty: 1 | Refills: 3 | Status: ACTIVE | COMMUNITY
Start: 2024-05-14 | End: 1900-01-01

## 2024-06-19 RX ORDER — CLOPIDOGREL BISULFATE 75 MG/1
75 TABLET, FILM COATED ORAL
Qty: 90 | Refills: 3 | Status: ACTIVE | COMMUNITY
Start: 2022-05-19

## 2024-06-19 RX ORDER — LOSARTAN POTASSIUM 50 MG/1
50 TABLET, FILM COATED ORAL DAILY
Qty: 90 | Refills: 3 | Status: ACTIVE | COMMUNITY
Start: 2022-08-04

## 2024-06-19 RX ORDER — GABAPENTIN 300 MG/1
300 CAPSULE ORAL
Qty: 270 | Refills: 3 | Status: ACTIVE | COMMUNITY
Start: 2017-08-08

## 2024-06-19 RX ORDER — ASPIRIN 81 MG/1
81 TABLET, COATED ORAL
Qty: 90 | Refills: 3 | Status: ACTIVE | COMMUNITY
Start: 2022-11-08

## 2024-08-22 ENCOUNTER — APPOINTMENT (OUTPATIENT)
Dept: HOME HEALTH SERVICES | Facility: HOME HEALTH | Age: 74
End: 2024-08-22
Payer: MEDICARE

## 2024-08-22 VITALS
DIASTOLIC BLOOD PRESSURE: 60 MMHG | OXYGEN SATURATION: 98 % | SYSTOLIC BLOOD PRESSURE: 160 MMHG | HEART RATE: 68 BPM | RESPIRATION RATE: 16 BRPM | TEMPERATURE: 97.8 F

## 2024-08-22 DIAGNOSIS — R41.89 CEREBRAL INFARCTION, UNSPECIFIED: ICD-10-CM

## 2024-08-22 DIAGNOSIS — I63.9 CEREBRAL INFARCTION, UNSPECIFIED: ICD-10-CM

## 2024-08-22 DIAGNOSIS — H26.9 UNSPECIFIED CATARACT: ICD-10-CM

## 2024-08-22 DIAGNOSIS — Q64.9 CONGENITAL MALFORMATION OF URINARY SYSTEM, UNSPECIFIED: ICD-10-CM

## 2024-08-22 DIAGNOSIS — R26.81 UNSTEADINESS ON FEET: ICD-10-CM

## 2024-08-22 DIAGNOSIS — E11.42 TYPE 2 DIABETES MELLITUS WITH DIABETIC POLYNEUROPATHY: ICD-10-CM

## 2024-08-22 PROCEDURE — 99349 HOME/RES VST EST MOD MDM 40: CPT

## 2024-08-22 NOTE — COUNSELING
[Normal Weight - ( BMI  <25 )] : normal weight - ( BMI  <25 ) [DASH diet recommended] : DASH diet recommended [Smoker, not interested in quitting] : smoker, not interested in quitting [] : abdominal aortic ultrasound [Date: ___] : diabetic screening completed on [unfilled] [Improve mobility] : improve mobility [Improve weight] : improve weight [Discussed disease trajectory with patient/caregiver] : discussed disease trajectory with patient/caregiver [Completed Healthcare Proxy] : completed healthcare proxy [Completed Medical Orders for Life-Sustaining Treatment] : completed medical orders for life-sustaining treatment [Full Code] : Code Status: Full Code [No Limitations] : Treatment Guidelines: No limitations [Trial of Intubation] : Intubation: Trial of Intubation [[facility]: _____[facility]last verification date: [unfilled] [_____] : HCP: [unfilled]

## 2024-08-22 NOTE — COUNSELING
[Normal Weight - ( BMI  <25 )] : normal weight - ( BMI  <25 ) [DASH diet recommended] : DASH diet recommended [Smoker, not interested in quitting] : smoker, not interested in quitting [] : abdominal aortic ultrasound [Date: ___] : diabetic screening completed on [unfilled] [Improve mobility] : improve mobility [Improve weight] : improve weight [Discussed disease trajectory with patient/caregiver] : discussed disease trajectory with patient/caregiver [Completed Healthcare Proxy] : completed healthcare proxy [Completed Medical Orders for Life-Sustaining Treatment] : completed medical orders for life-sustaining treatment [Full Code] : Code Status: Full Code [No Limitations] : Treatment Guidelines: No limitations [Trial of Intubation] : Intubation: Trial of Intubation [[facility]: _____] [facility]last verification date: [unfilled] [_____] : HCP: [unfilled]

## 2024-08-23 ENCOUNTER — LABORATORY RESULT (OUTPATIENT)
Age: 74
End: 2024-08-23

## 2024-08-27 ENCOUNTER — LABORATORY RESULT (OUTPATIENT)
Age: 74
End: 2024-08-27

## 2024-08-27 PROBLEM — R26.81 GAIT INSTABILITY: Status: ACTIVE | Noted: 2024-08-27

## 2024-08-27 PROBLEM — Q64.9 URINARY ANOMALY: Status: ACTIVE | Noted: 2024-08-27

## 2024-08-27 NOTE — PHYSICAL EXAM
[No Acute Distress] : no acute distress [Normal Voice/Communication] : normal voice communication [Normal Sclera/Conjunctiva] : normal sclera/conjunctiva [Normal Outer Ear/Nose] : the ears and nose were normal in appearance [Normal TMs] : both tympanic membranes were normal [No JVD] : no jugular venous distention [No Respiratory Distress] : no respiratory distress [Clear to Auscultation] : lungs were clear to auscultation bilaterally [No Accessory Muscle Use] : no accessory muscle use [Normal Rate] : heart rate was normal  [Regular Rhythm] : with a regular rhythm [Normal S1, S2] : normal S1 and S2 [No Murmurs] : no murmurs heard [No Edema] : there was no peripheral edema [Normal Bowel Sounds] : normal bowel sounds [Non Tender] : non-tender [Soft] : abdomen soft [Not Distended] : not distended [No Spinal Tenderness] : no spinal tenderness [No Joint Swelling] : no joint swelling seen [No Clubbing, Cyanosis] : no clubbing  or cyanosis of the fingernails [No Rash] : no rash [No Skin Lesions] : no skin lesions [Kyphosis] : no kyphosis present [Scoliosis] : scoliosis not present [de-identified] : A&Ox2-3 [de-identified] : slow, shuffling gait [de-identified] : A&Ox2, more confused

## 2024-08-27 NOTE — REVIEW OF SYSTEMS
[Memory Loss] : memory loss [de-identified] : as noted in HPI [Negative] : Psychiatric [FreeTextEntry2] : as noted in HPI [FreeTextEntry3] : as noted in HPI [FreeTextEntry9] : as noted in HPI

## 2024-08-27 NOTE — PHYSICAL EXAM
[No Acute Distress] : no acute distress [Normal Voice/Communication] : normal voice communication [Normal Sclera/Conjunctiva] : normal sclera/conjunctiva [Normal Outer Ear/Nose] : the ears and nose were normal in appearance [Normal TMs] : both tympanic membranes were normal [No JVD] : no jugular venous distention [No Respiratory Distress] : no respiratory distress [Clear to Auscultation] : lungs were clear to auscultation bilaterally [No Accessory Muscle Use] : no accessory muscle use [Normal Rate] : heart rate was normal  [Regular Rhythm] : with a regular rhythm [Normal S1, S2] : normal S1 and S2 [No Murmurs] : no murmurs heard [No Edema] : there was no peripheral edema [Normal Bowel Sounds] : normal bowel sounds [Non Tender] : non-tender [Soft] : abdomen soft [Not Distended] : not distended [No Spinal Tenderness] : no spinal tenderness [No Joint Swelling] : no joint swelling seen [No Clubbing, Cyanosis] : no clubbing  or cyanosis of the fingernails [No Rash] : no rash [No Skin Lesions] : no skin lesions [Kyphosis] : no kyphosis present [Scoliosis] : scoliosis not present [de-identified] : A&Ox2-3 [de-identified] : slow, shuffling gait [de-identified] : A&Ox2, more confused

## 2024-08-27 NOTE — REASON FOR VISIT
[Follow-Up] : a follow-up visit [Family Member] : family member [Pre-Visit Preparation] : pre-visit preparation was done [Intercurrent Specialty/Sub-specialty Visits] : the patient has intercurrent specialty/sub-specialty visits [FreeTextEntry1] : for Diabetes mellitus type 2 with neuropathy,  hypertension, hyperlipidemia

## 2024-08-27 NOTE — REVIEW OF SYSTEMS
[Memory Loss] : memory loss [de-identified] : as noted in HPI [Negative] : Psychiatric [FreeTextEntry2] : as noted in HPI [FreeTextEntry3] : as noted in HPI [FreeTextEntry9] : as noted in HPI

## 2024-08-27 NOTE — HEALTH RISK ASSESSMENT
[Independent] : using telephone [Some assistance needed] : shopping [Full assistance needed] : managing medications [] : managing finances [No falls in past year] : Patient reported no falls in the past year [Yes] : The patient has visual impairment [HRA Reviewed] : Health Risk Assessment reviewed [[facility]] : 30

## 2024-08-27 NOTE — HISTORY OF PRESENT ILLNESS
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] : PMH: Diabetes mellitus type 2 with neuropathy,  hypertension, hyperlipidemia, smoker,  BPH, CVA,  ICH  2024: -reports pain in the back and radiating down right leg onset after his second stroke last year -reports compliance with medications- grandson gives them to him -does not check finger sticks - wants to go to nursing home for rehab as he never received PT at home - toe nails long as podiatry never called from 2024 visit - wants battery for  wife's electric wheelchair; rides his scooter but eye sight "bad" & he needs new glasses to ride to the [employer] store - has never f/u with ophthalmologist after cataract sx - "[place] in urine in pot in the morning"  -  Multiple CVAs () on Plavix; CT brain  in 2023 with chronic right sided cerebral infarct; on Plavix -  DM:  A1c 9.7 in 2024- still not checking blood sugars -  on metformin 500mg BID, glipizide 10 BID; still will not take Trulicity or perform fingerstick as he does not want to stick himself; on gabapentin 300mg TID for neuropathy - CKD: Creat 1.02 GFR 79 in 2023 - HTN: on amlodipine, losartan, labetalol  - HLD:  triglycerides, total cholesterol  normal;  LDL high in  but decreased from previous- on atorvastatin 80 - BPH: on doxazosin- denies urinary issues- -  C5-C6 & C6-C7 moderate spondylosis  24 visit Pt A&Ox 2-3- Riding scooter in yard on arrival, Able to walk up 4 steps into the house but needs a cane to help him as there are no railings- will order - Was in [place] from 2023 until 2024 and hospitalized while there but does not remember why - Reports numbness of hands continues since CVA - has a hard time manipulating utensils, lighter as he is still smoking Still not checking finger sticks but would like Freestyle Mildred device - report Eyesight still bad never had 2nd cataract surgery - Toes & fingernails long  - Appetite is good- no weight loss - Denies constipation - Has no urinary issues - Daughter now pre-pouring medications for him as he no longer uses mail order pharmacy  -  Multiple CVAs () on Plavix; CT brain  in 2023 with chronic right sided cerebral infarct -  DM:  A1c 7.8 in - still not checking blood sugars -  on metformin 500mg BID, glipizide 10 BID; will not take Trulicity or perform fingerstick as he does not want to stick himself; on gabapentin 300mg TID for neuropathy - CKD: Creat 1.02 GFR 79 in 2023 - HTN: on amlodipine, losartan, labetalol  - HLD:  triglycerides, total cholesterol  normal;  LDL high in  but decreased from previous- on atorvastatin 80 - BPH: on doxazosin- denies urinary issues- -  C5-C6 & C6-C7 moderate spondylosis

## 2024-08-27 NOTE — HISTORY OF PRESENT ILLNESS
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] : PMH: Diabetes mellitus type 2 with neuropathy,  hypertension, hyperlipidemia, smoker,  BPH, CVA,  ICH  2024: -reports pain in the back and radiating down right leg onset after his second stroke last year -reports compliance with medications- grandson gives them to him -does not check finger sticks - wants to go to nursing home for rehab as he never received PT at home - toe nails long as podiatry never called from 2024 visit - wants battery for  wife's electric wheelchair; rides his scooter but eye sight "bad" & he needs new glasses to ride to the [employer] store - has never f/u with ophthalmologist after cataract sx - "[place] in urine in pot in the morning"  -  Multiple CVAs () on Plavix; CT brain  in 2023 with chronic right sided cerebral infarct; on Plavix -  DM:  A1c 9.7 in 2024- still not checking blood sugars -  on metformin 500mg BID, glipizide 10 BID; still will not take Trulicity or perform fingerstick as he does not want to stick himself; on gabapentin 300mg TID for neuropathy - CKD: Creat 1.02 GFR 79 in 2023 - HTN: on amlodipine, losartan, labetalol  - HLD:  triglycerides, total cholesterol  normal;  LDL high in  but decreased from previous- on atorvastatin 80 - BPH: on doxazosin- denies urinary issues- -  C5-C6 & C6-C7 moderate spondylosis  24 visit Pt A&Ox 2-3- Riding scooter in yard on arrival, Able to walk up 4 steps into the house but needs a cane to help him as there are no railings- will order - Was in [place] from 2023 until 2024 and hospitalized while there but does not remember why - Reports numbness of hands continues since CVA - has a hard time manipulating utensils, lighter as he is still smoking Still not checking finger sticks but would like Freestyle Mildred device - report Eyesight still bad never had 2nd cataract surgery - Toes & fingernails long  - Appetite is good- no weight loss - Denies constipation - Has no urinary issues - Daughter now pre-pouring medications for him as he no longer uses mail order pharmacy  -  Multiple CVAs () on Plavix; CT brain  in 2023 with chronic right sided cerebral infarct -  DM:  A1c 7.8 in - still not checking blood sugars -  on metformin 500mg BID, glipizide 10 BID; will not take Trulicity or perform fingerstick as he does not want to stick himself; on gabapentin 300mg TID for neuropathy - CKD: Creat 1.02 GFR 79 in 2023 - HTN: on amlodipine, losartan, labetalol  - HLD:  triglycerides, total cholesterol  normal;  LDL high in  but decreased from previous- on atorvastatin 80 - BPH: on doxazosin- denies urinary issues- -  C5-C6 & C6-C7 moderate spondylosis

## 2024-08-27 NOTE — END OF VISIT
[FreeTextEntry3] : All medical record entries made by the Ranjaniblonny were at my, Rosa Barone, direction and personally dictated by me on 08/22/2024. I have reviewed the chart and agree that the record accurately reflects my personal performance of the history, physical exam, assessment, and plan. I have also personally directed, reviewed, and agreed with the chart.

## 2024-08-27 NOTE — HEALTH RISK ASSESSMENT
[Independent] : using telephone [Some assistance needed] : shopping [Full assistance needed] : managing medications [] : managing finances [No falls in past year] : Patient reported no falls in the past year [Yes] : The patient has visual impairment [HRA Reviewed] : Health Risk Assessment reviewed [[facility]] : 30

## 2024-10-07 DIAGNOSIS — Z23 ENCOUNTER FOR IMMUNIZATION: ICD-10-CM

## 2024-10-08 ENCOUNTER — APPOINTMENT (OUTPATIENT)
Dept: HOME HEALTH SERVICES | Facility: HOME HEALTH | Age: 74
End: 2024-10-08

## 2024-10-08 VITALS
HEART RATE: 76 BPM | DIASTOLIC BLOOD PRESSURE: 70 MMHG | SYSTOLIC BLOOD PRESSURE: 164 MMHG | RESPIRATION RATE: 18 BRPM | OXYGEN SATURATION: 99 % | TEMPERATURE: 97.4 F

## 2024-10-09 RX ORDER — AMMONIUM LACTATE 12 %
12 CREAM (GRAM) TOPICAL
Qty: 3 | Refills: 3 | Status: ACTIVE | COMMUNITY
Start: 2024-10-08 | End: 1900-01-01

## 2024-10-10 ENCOUNTER — RX RENEWAL (OUTPATIENT)
Age: 74
End: 2024-10-10

## 2024-12-03 ENCOUNTER — NON-APPOINTMENT (OUTPATIENT)
Age: 74
End: 2024-12-03

## 2025-03-13 ENCOUNTER — NON-APPOINTMENT (OUTPATIENT)
Age: 75
End: 2025-03-13

## 2025-08-26 ENCOUNTER — NON-APPOINTMENT (OUTPATIENT)
Age: 75
End: 2025-08-26

## 2025-09-11 ENCOUNTER — NON-APPOINTMENT (OUTPATIENT)
Age: 75
End: 2025-09-11

## 2025-09-16 ENCOUNTER — NON-APPOINTMENT (OUTPATIENT)
Age: 75
End: 2025-09-16